# Patient Record
Sex: FEMALE | Race: WHITE | NOT HISPANIC OR LATINO | Employment: FULL TIME | URBAN - NONMETROPOLITAN AREA
[De-identification: names, ages, dates, MRNs, and addresses within clinical notes are randomized per-mention and may not be internally consistent; named-entity substitution may affect disease eponyms.]

---

## 2023-06-05 DIAGNOSIS — J45.902 ASTHMA WITH STATUS ASTHMATICUS, UNSPECIFIED ASTHMA SEVERITY, UNSPECIFIED WHETHER PERSISTENT (HHS-HCC): ICD-10-CM

## 2023-06-05 RX ORDER — ALBUTEROL SULFATE 90 UG/1
2 AEROSOL, METERED RESPIRATORY (INHALATION) EVERY 4 HOURS PRN
COMMUNITY
Start: 2019-04-30 | End: 2023-06-05 | Stop reason: SDUPTHER

## 2023-06-05 RX ORDER — ALBUTEROL SULFATE 0.83 MG/ML
SOLUTION RESPIRATORY (INHALATION)
COMMUNITY
Start: 2014-10-03

## 2023-06-05 RX ORDER — ALBUTEROL SULFATE 90 UG/1
2 AEROSOL, METERED RESPIRATORY (INHALATION) EVERY 4 HOURS PRN
Qty: 18 G | Refills: 0 | Status: SHIPPED | OUTPATIENT
Start: 2023-06-05 | End: 2023-08-02 | Stop reason: SDUPTHER

## 2023-06-09 PROBLEM — I10 HYPERTENSION: Status: ACTIVE | Noted: 2023-06-09

## 2023-06-09 PROBLEM — L25.9 CONTACT DERMATITIS: Status: ACTIVE | Noted: 2023-06-09

## 2023-06-09 PROBLEM — K57.30 DIVERTICULOSIS OF COLON: Status: ACTIVE | Noted: 2023-06-09

## 2023-06-09 PROBLEM — D49.2 SKIN GROWTH: Status: ACTIVE | Noted: 2023-06-09

## 2023-06-09 PROBLEM — E66.9 OBESE: Status: ACTIVE | Noted: 2023-06-09

## 2023-06-09 PROBLEM — J45.909 AB (ASTHMATIC BRONCHITIS) (HHS-HCC): Status: ACTIVE | Noted: 2023-06-09

## 2023-06-09 PROBLEM — J01.90 ACUTE SINUSITIS: Status: ACTIVE | Noted: 2023-06-09

## 2023-06-09 PROBLEM — H10.30 ACUTE CONJUNCTIVITIS: Status: ACTIVE | Noted: 2023-06-09

## 2023-06-09 PROBLEM — G43.909 MIGRAINE HEADACHE: Status: ACTIVE | Noted: 2023-06-09

## 2023-06-09 PROBLEM — B37.31 VAGINAL CANDIDIASIS: Status: ACTIVE | Noted: 2023-06-09

## 2023-06-09 PROBLEM — K52.9 ACUTE GASTROENTERITIS: Status: ACTIVE | Noted: 2023-06-09

## 2023-06-09 PROBLEM — M85.80 OSTEOPENIA: Status: ACTIVE | Noted: 2023-06-09

## 2023-06-09 PROBLEM — J45.901 ACUTE ASTHMA EXACERBATION (HHS-HCC): Status: ACTIVE | Noted: 2023-06-09

## 2023-06-09 PROBLEM — R10.11 ABDOMINAL PAIN, RUQ: Status: ACTIVE | Noted: 2023-06-09

## 2023-06-09 PROBLEM — M77.32 HEEL SPUR, LEFT: Status: ACTIVE | Noted: 2023-06-09

## 2023-06-09 PROBLEM — K21.9 GERD (GASTROESOPHAGEAL REFLUX DISEASE): Status: ACTIVE | Noted: 2023-06-09

## 2023-06-09 PROBLEM — M72.2 PLANTAR FASCIITIS: Status: ACTIVE | Noted: 2023-06-09

## 2023-06-09 PROBLEM — R52 BODY ACHES: Status: ACTIVE | Noted: 2023-06-09

## 2023-06-09 PROBLEM — F41.9 ANXIETY: Status: ACTIVE | Noted: 2023-06-09

## 2023-06-09 PROBLEM — Z04.9 CONDITION NOT FOUND: Status: ACTIVE | Noted: 2023-06-09

## 2023-06-09 PROBLEM — D12.6 TUBULAR ADENOMA OF COLON: Status: ACTIVE | Noted: 2023-06-09

## 2023-06-09 PROBLEM — S30.860A TICK BITE OF BACK: Status: ACTIVE | Noted: 2023-06-09

## 2023-06-09 PROBLEM — R05.9 COUGH: Status: ACTIVE | Noted: 2023-06-09

## 2023-06-09 PROBLEM — R06.00 DYSPNEA: Status: ACTIVE | Noted: 2023-06-09

## 2023-06-09 PROBLEM — K52.9 CHRONIC DIARRHEA: Status: ACTIVE | Noted: 2023-06-09

## 2023-06-09 PROBLEM — L30.9 DERMATITIS, ECZEMATOID: Status: ACTIVE | Noted: 2023-06-09

## 2023-06-09 PROBLEM — J18.9 PNEUMONIA: Status: ACTIVE | Noted: 2023-06-09

## 2023-06-09 PROBLEM — U07.1 COVID-19: Status: ACTIVE | Noted: 2023-06-09
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
## 2023-06-09 PROBLEM — K02.9 DENTAL CARIES: Status: ACTIVE | Noted: 2023-06-09

## 2023-06-09 PROBLEM — R10.33 UMBILICAL PAIN: Status: ACTIVE | Noted: 2023-06-09

## 2023-06-09 PROBLEM — E55.9 VITAMIN D DEFICIENCY: Status: ACTIVE | Noted: 2023-06-09

## 2023-06-09 PROBLEM — N39.0 URINARY TRACT INFECTION: Status: ACTIVE | Noted: 2023-06-09

## 2023-06-09 PROBLEM — R09.02 HYPOXIA: Status: ACTIVE | Noted: 2023-06-09

## 2023-06-09 PROBLEM — N13.30 HYDRONEPHROSIS: Status: ACTIVE | Noted: 2023-06-09

## 2023-06-09 PROBLEM — B37.0 ORAL CANDIDIASIS: Status: ACTIVE | Noted: 2023-06-09

## 2023-06-09 PROBLEM — E11.9 CONTROLLED DIABETES MELLITUS (MULTI): Status: ACTIVE | Noted: 2023-06-09

## 2023-06-09 PROBLEM — M79.672 LEFT FOOT PAIN: Status: ACTIVE | Noted: 2023-06-09

## 2023-06-09 PROBLEM — M54.9 BACK PAIN, ACUTE: Status: ACTIVE | Noted: 2023-06-09

## 2023-06-09 PROBLEM — J02.9 SORE THROAT: Status: ACTIVE | Noted: 2023-06-09

## 2023-06-09 PROBLEM — E78.00 HYPERCHOLESTEROLEMIA: Status: ACTIVE | Noted: 2023-06-09

## 2023-06-09 PROBLEM — K42.9 UMBILICAL HERNIA: Status: ACTIVE | Noted: 2023-06-09

## 2023-06-09 PROBLEM — J02.9 ACUTE PHARYNGITIS: Status: ACTIVE | Noted: 2023-06-09

## 2023-06-09 PROBLEM — B35.1 ONYCHOMYCOSIS: Status: ACTIVE | Noted: 2023-06-09

## 2023-06-09 PROBLEM — M81.0 POST-MENOPAUSAL OSTEOPOROSIS: Status: ACTIVE | Noted: 2023-06-09

## 2023-06-09 PROBLEM — E11.65 TYPE 2 DIABETES MELLITUS WITH HYPERGLYCEMIA, WITHOUT LONG-TERM CURRENT USE OF INSULIN (MULTI): Status: ACTIVE | Noted: 2023-06-09

## 2023-06-09 RX ORDER — CALCIUM CITRATE/VITAMIN D3 200MG-6.25
TABLET ORAL
COMMUNITY
Start: 2022-03-23 | End: 2023-10-02 | Stop reason: SDUPTHER

## 2023-06-09 RX ORDER — SUMATRIPTAN 5 MG/1
SPRAY NASAL
COMMUNITY

## 2023-06-09 RX ORDER — CHOLECALCIFEROL (VITAMIN D3) 50 MCG
1 TABLET ORAL DAILY
COMMUNITY
Start: 2014-02-28

## 2023-06-09 RX ORDER — GUAIFENESIN 1200 MG/1
TABLET, EXTENDED RELEASE ORAL EVERY 12 HOURS PRN
COMMUNITY
Start: 2022-04-20

## 2023-06-09 RX ORDER — BUDESONIDE AND FORMOTEROL FUMARATE DIHYDRATE 160; 4.5 UG/1; UG/1
AEROSOL RESPIRATORY (INHALATION) 2 TIMES DAILY
COMMUNITY

## 2023-06-09 RX ORDER — PEN NEEDLE, DIABETIC 31 GX5/16"
NEEDLE, DISPOSABLE MISCELLANEOUS
COMMUNITY
Start: 2015-04-06

## 2023-06-09 RX ORDER — ATORVASTATIN CALCIUM 40 MG/1
TABLET, FILM COATED ORAL
COMMUNITY
Start: 2019-05-10 | End: 2023-08-02 | Stop reason: SDUPTHER

## 2023-06-12 ENCOUNTER — APPOINTMENT (OUTPATIENT)
Dept: PRIMARY CARE | Facility: CLINIC | Age: 63
End: 2023-06-12

## 2023-07-10 ENCOUNTER — APPOINTMENT (OUTPATIENT)
Dept: PRIMARY CARE | Facility: CLINIC | Age: 63
End: 2023-07-10
Payer: COMMERCIAL

## 2023-07-24 ENCOUNTER — TELEPHONE (OUTPATIENT)
Dept: PRIMARY CARE | Facility: CLINIC | Age: 63
End: 2023-07-24
Payer: COMMERCIAL

## 2023-07-24 NOTE — TELEPHONE ENCOUNTER
Transition of Care    Inpatient facility[facility]  Discharge diagnosis: Chest Pain / HTN  Discharged to: Home  Discharge date: 7/23/23  Initial Call date: 7/24/23  Spoke with patient/caregiver: Patient                                                                     Do you need assistance  visits prior to your PCP visit: No  Home health care ordered: No  Have you been contacted by home care and have a start of care date: No  Are you taking medications as prescribed at discharge: Yes    Referral to [facility] Pharmacist: No  Patient advised to bring all medications to PCP follow-up appointment.  Patient advised to follow discharge instructions until provider follow-up.  TCM visit date: 8/1/23  TCM provider visit with: Betty Flores MD

## 2023-08-01 ENCOUNTER — APPOINTMENT (OUTPATIENT)
Dept: PRIMARY CARE | Facility: CLINIC | Age: 63
End: 2023-08-01
Payer: COMMERCIAL

## 2023-08-02 ENCOUNTER — OFFICE VISIT (OUTPATIENT)
Dept: PRIMARY CARE | Facility: CLINIC | Age: 63
End: 2023-08-02
Payer: COMMERCIAL

## 2023-08-02 VITALS
OXYGEN SATURATION: 93 % | BODY MASS INDEX: 41.7 KG/M2 | WEIGHT: 250.6 LBS | SYSTOLIC BLOOD PRESSURE: 122 MMHG | HEART RATE: 66 BPM | DIASTOLIC BLOOD PRESSURE: 86 MMHG

## 2023-08-02 DIAGNOSIS — E66.01 CLASS 3 SEVERE OBESITY DUE TO EXCESS CALORIES WITH SERIOUS COMORBIDITY AND BODY MASS INDEX (BMI) OF 40.0 TO 44.9 IN ADULT (MULTI): ICD-10-CM

## 2023-08-02 DIAGNOSIS — R07.9 CHEST PAIN, UNSPECIFIED TYPE: Primary | ICD-10-CM

## 2023-08-02 DIAGNOSIS — E78.00 HYPERCHOLESTEROLEMIA: ICD-10-CM

## 2023-08-02 DIAGNOSIS — I10 HYPERTENSION, UNSPECIFIED TYPE: ICD-10-CM

## 2023-08-02 DIAGNOSIS — J45.902 ASTHMA WITH STATUS ASTHMATICUS, UNSPECIFIED ASTHMA SEVERITY, UNSPECIFIED WHETHER PERSISTENT (HHS-HCC): ICD-10-CM

## 2023-08-02 DIAGNOSIS — E11.65 TYPE 2 DIABETES MELLITUS WITH HYPERGLYCEMIA, WITHOUT LONG-TERM CURRENT USE OF INSULIN (MULTI): ICD-10-CM

## 2023-08-02 PROCEDURE — 3079F DIAST BP 80-89 MM HG: CPT | Performed by: INTERNAL MEDICINE

## 2023-08-02 PROCEDURE — 3074F SYST BP LT 130 MM HG: CPT | Performed by: INTERNAL MEDICINE

## 2023-08-02 PROCEDURE — 99496 TRANSJ CARE MGMT HIGH F2F 7D: CPT | Performed by: INTERNAL MEDICINE

## 2023-08-02 RX ORDER — AMLODIPINE BESYLATE 5 MG/1
5 TABLET ORAL DAILY
COMMUNITY
Start: 2023-07-23 | End: 2023-08-02 | Stop reason: SDUPTHER

## 2023-08-02 RX ORDER — ATORVASTATIN CALCIUM 40 MG/1
40 TABLET, FILM COATED ORAL DAILY
Qty: 30 TABLET | Refills: 1 | Status: SHIPPED | OUTPATIENT
Start: 2023-08-02 | End: 2023-11-01 | Stop reason: SDUPTHER

## 2023-08-02 RX ORDER — ALBUTEROL SULFATE 90 UG/1
2 AEROSOL, METERED RESPIRATORY (INHALATION) EVERY 4 HOURS PRN
Qty: 18 G | Refills: 2 | Status: SHIPPED | OUTPATIENT
Start: 2023-08-02 | End: 2023-11-01 | Stop reason: SDUPTHER

## 2023-08-02 RX ORDER — AMLODIPINE BESYLATE 5 MG/1
5 TABLET ORAL DAILY
Qty: 30 TABLET | Refills: 1 | Status: SHIPPED | OUTPATIENT
Start: 2023-08-02 | End: 2023-09-06 | Stop reason: ALTCHOICE

## 2023-08-02 NOTE — PROGRESS NOTES
Subjective   Patient ID: Kamilah Manzo is a 63 y.o. female who presents for Hospital Follow-up (TCM).  HPI      TCM       Dx chest pain, htn          Under stress          Cardio follow up with stress test next week          Counseling recommended            hypertension          Amlodipine started           Home bp's acceptable no side effects    h/o COVID  no residuals     asthma stable on rx no side effects     hypercholesterolemia on rx no side effects      vit D stable on rx no side effects     DM on diet      diet / exercise rev'd     eyes and feet on follow up     low fat diet     continue meds as are     check CT urogram not done (too $$$)  urology consult not done     follow up Dr Jac rose colonoscopy for h/o colon polyps not done yet  dexa past due  holding off due to poor insurance plan     GYN on follow up     mammogram and labs ordered    Review of Systems   All other systems reviewed and are negative.      Objective   /86   Pulse 66   Wt 114 kg (250 lb 9.6 oz)   SpO2 93%   BMI 41.70 kg/m²   Lab Results   Component Value Date    WBC 11.3 07/22/2023    HGB 15.1 07/22/2023    HCT 47.1 (H) 07/22/2023     07/22/2023    CHOL 191 07/22/2023    TRIG 213 (H) 07/22/2023    HDL 41.6 07/22/2023    ALT 20 07/21/2023    AST 14 07/21/2023     07/22/2023    K 3.6 07/22/2023     07/22/2023    CREATININE 0.53 07/22/2023    BUN 8 07/22/2023    CO2 27 07/22/2023    TSH 2.40 07/21/2023    INR 1.0 07/21/2023    HGBA1C 6.9 (A) 10/20/2021           Physical Exam  Vitals reviewed.   Constitutional:       Appearance: Normal appearance. She is obese.   HENT:      Head: Normocephalic and atraumatic.      Mouth/Throat:      Pharynx: No posterior oropharyngeal erythema.   Eyes:      General: No scleral icterus.     Conjunctiva/sclera: Conjunctivae normal.      Pupils: Pupils are equal, round, and reactive to light.   Cardiovascular:      Rate and Rhythm: Normal rate and regular rhythm.      Heart  sounds: Normal heart sounds.   Pulmonary:      Effort: No respiratory distress.      Breath sounds: No wheezing.   Abdominal:      General: Abdomen is flat. Bowel sounds are normal. There is no distension.      Palpations: Abdomen is soft. There is no mass.      Tenderness: There is no abdominal tenderness. There is no rebound.   Musculoskeletal:         General: Normal range of motion.      Cervical back: Normal range of motion and neck supple.   Skin:     General: Skin is warm and dry.   Neurological:      General: No focal deficit present.      Mental Status: She is alert and oriented to person, place, and time. Mental status is at baseline.   Psychiatric:         Mood and Affect: Mood normal.         Behavior: Behavior normal.         Thought Content: Thought content normal.         Judgment: Judgment normal.         Problem List Items Addressed This Visit          Cardiac and Vasculature    Hypercholesterolemia    Relevant Medications    atorvastatin (Lipitor) 40 mg tablet    Hypertension    Relevant Medications    amLODIPine (Norvasc) 5 mg tablet       Endocrine/Metabolic    Obese    Type 2 diabetes mellitus with hyperglycemia, without long-term current use of insulin (CMS/[facility])     Other Visit Diagnoses       Chest pain, unspecified type    -  Primary    Asthma with status asthmaticus, unspecified asthma severity, unspecified whether persistent        Relevant Medications    albuterol 90 mcg/actuation inhaler          Assessment/Plan   TCM       Dx chest pain, htn          Under stress          Cardio follow up with stress test next week          Counseling recommended pt declined          Wellbutrin discussed            hypertension          Amlodipine started no side effects          Home bp's acceptable ans scanned    h/o COVID  no residuals     asthma stable on rx no side effects     hypercholesterolemia on rx no side effects      vit D stable on rx no side effects     DM on diet      diet / exercise  rev'd     eyes and feet on follow up     low fat diet     continue meds as are     check CT urogram not done (too $$$)  urology consult not done     follow up Dr Jac rose colonoscopy for h/o colon polyps not done yet    dexa past due     GYN on follow up     mammogram and labs ordered not done    mammogram 12-21  dexa 8-20  colonoscopy 2013  GYN past due  CT lung cancer screening n/a  immunizations rev'd  BMI 41.3    Follow up 3 weeks

## 2023-08-22 ENCOUNTER — APPOINTMENT (OUTPATIENT)
Dept: PRIMARY CARE | Facility: CLINIC | Age: 63
End: 2023-08-22
Payer: COMMERCIAL

## 2023-08-28 ENCOUNTER — APPOINTMENT (OUTPATIENT)
Dept: PRIMARY CARE | Facility: CLINIC | Age: 63
End: 2023-08-28
Payer: COMMERCIAL

## 2023-09-06 ENCOUNTER — OFFICE VISIT (OUTPATIENT)
Dept: PRIMARY CARE | Facility: CLINIC | Age: 63
End: 2023-09-06
Payer: COMMERCIAL

## 2023-09-06 ENCOUNTER — LAB (OUTPATIENT)
Dept: LAB | Facility: LAB | Age: 63
End: 2023-09-06
Payer: COMMERCIAL

## 2023-09-06 VITALS
HEART RATE: 96 BPM | SYSTOLIC BLOOD PRESSURE: 142 MMHG | WEIGHT: 251.8 LBS | OXYGEN SATURATION: 95 % | BODY MASS INDEX: 41.9 KG/M2 | DIASTOLIC BLOOD PRESSURE: 84 MMHG

## 2023-09-06 DIAGNOSIS — R35.0 URINARY FREQUENCY: ICD-10-CM

## 2023-09-06 DIAGNOSIS — I10 HYPERTENSION, UNSPECIFIED TYPE: ICD-10-CM

## 2023-09-06 DIAGNOSIS — E55.9 VITAMIN D DEFICIENCY: ICD-10-CM

## 2023-09-06 DIAGNOSIS — J45.20 MILD INTERMITTENT ASTHMA, UNSPECIFIED WHETHER COMPLICATED (HHS-HCC): ICD-10-CM

## 2023-09-06 DIAGNOSIS — Z00.00 ANNUAL PHYSICAL EXAM: Primary | ICD-10-CM

## 2023-09-06 DIAGNOSIS — E78.00 HYPERCHOLESTEROLEMIA: ICD-10-CM

## 2023-09-06 DIAGNOSIS — E11.65 TYPE 2 DIABETES MELLITUS WITH HYPERGLYCEMIA, WITHOUT LONG-TERM CURRENT USE OF INSULIN (MULTI): ICD-10-CM

## 2023-09-06 DIAGNOSIS — K44.9 PARAESOPHAGEAL HERNIA: ICD-10-CM

## 2023-09-06 DIAGNOSIS — E66.01 CLASS 3 SEVERE OBESITY DUE TO EXCESS CALORIES WITH SERIOUS COMORBIDITY AND BODY MASS INDEX (BMI) OF 40.0 TO 44.9 IN ADULT (MULTI): ICD-10-CM

## 2023-09-06 LAB
ALANINE AMINOTRANSFERASE (SGPT) (U/L) IN SER/PLAS: 41 U/L (ref 7–45)
ALBUMIN (G/DL) IN SER/PLAS: 4 G/DL (ref 3.4–5)
ALKALINE PHOSPHATASE (U/L) IN SER/PLAS: 86 U/L (ref 33–136)
ANION GAP IN SER/PLAS: 10 MMOL/L (ref 10–20)
ASPARTATE AMINOTRANSFERASE (SGOT) (U/L) IN SER/PLAS: 26 U/L (ref 9–39)
BASOPHILS (10*3/UL) IN BLOOD BY AUTOMATED COUNT: 0.06 X10E9/L (ref 0–0.1)
BASOPHILS/100 LEUKOCYTES IN BLOOD BY AUTOMATED COUNT: 0.6 % (ref 0–2)
BILIRUBIN TOTAL (MG/DL) IN SER/PLAS: 0.5 MG/DL (ref 0–1.2)
CALCIUM (MG/DL) IN SER/PLAS: 9.5 MG/DL (ref 8.6–10.3)
CARBON DIOXIDE, TOTAL (MMOL/L) IN SER/PLAS: 32 MMOL/L (ref 21–32)
CHLORIDE (MMOL/L) IN SER/PLAS: 101 MMOL/L (ref 98–107)
CREATININE (MG/DL) IN SER/PLAS: 0.6 MG/DL (ref 0.5–1.05)
EOSINOPHILS (10*3/UL) IN BLOOD BY AUTOMATED COUNT: 0.36 X10E9/L (ref 0–0.7)
EOSINOPHILS/100 LEUKOCYTES IN BLOOD BY AUTOMATED COUNT: 3.4 % (ref 0–6)
ERYTHROCYTE DISTRIBUTION WIDTH (RATIO) BY AUTOMATED COUNT: 13.3 % (ref 11.5–14.5)
ERYTHROCYTE MEAN CORPUSCULAR HEMOGLOBIN CONCENTRATION (G/DL) BY AUTOMATED: 31.6 G/DL (ref 32–36)
ERYTHROCYTE MEAN CORPUSCULAR VOLUME (FL) BY AUTOMATED COUNT: 97 FL (ref 80–100)
ERYTHROCYTES (10*6/UL) IN BLOOD BY AUTOMATED COUNT: 5.03 X10E12/L (ref 4–5.2)
GFR FEMALE: >90 ML/MIN/1.73M2
GLUCOSE (MG/DL) IN SER/PLAS: 194 MG/DL (ref 74–99)
HEMATOCRIT (%) IN BLOOD BY AUTOMATED COUNT: 48.7 % (ref 36–46)
HEMOGLOBIN (G/DL) IN BLOOD: 15.4 G/DL (ref 12–16)
IMMATURE GRANULOCYTES/100 LEUKOCYTES IN BLOOD BY AUTOMATED COUNT: 0.5 % (ref 0–0.9)
LEUKOCYTES (10*3/UL) IN BLOOD BY AUTOMATED COUNT: 10.7 X10E9/L (ref 4.4–11.3)
LYMPHOCYTES (10*3/UL) IN BLOOD BY AUTOMATED COUNT: 2.96 X10E9/L (ref 1.2–4.8)
LYMPHOCYTES/100 LEUKOCYTES IN BLOOD BY AUTOMATED COUNT: 27.6 % (ref 13–44)
MONOCYTES (10*3/UL) IN BLOOD BY AUTOMATED COUNT: 0.67 X10E9/L (ref 0.1–1)
MONOCYTES/100 LEUKOCYTES IN BLOOD BY AUTOMATED COUNT: 6.2 % (ref 2–10)
NEUTROPHILS (10*3/UL) IN BLOOD BY AUTOMATED COUNT: 6.64 X10E9/L (ref 1.2–7.7)
NEUTROPHILS/100 LEUKOCYTES IN BLOOD BY AUTOMATED COUNT: 61.7 % (ref 40–80)
PLATELETS (10*3/UL) IN BLOOD AUTOMATED COUNT: 348 X10E9/L (ref 150–450)
POC APPEARANCE, URINE: CLEAR
POC BILIRUBIN, URINE: NEGATIVE
POC BLOOD, URINE: NEGATIVE
POC COLOR, URINE: YELLOW
POC GLUCOSE, URINE: ABNORMAL MG/DL
POC KETONES, URINE: NEGATIVE MG/DL
POC LEUKOCYTES, URINE: NEGATIVE
POC NITRITE,URINE: NEGATIVE
POC PH, URINE: 5 PH
POC PROTEIN, URINE: NEGATIVE MG/DL
POC SPECIFIC GRAVITY, URINE: >=1.03
POC UROBILINOGEN, URINE: 0.2 EU/DL
POTASSIUM (MMOL/L) IN SER/PLAS: 4.8 MMOL/L (ref 3.5–5.3)
PROTEIN TOTAL: 6.8 G/DL (ref 6.4–8.2)
SODIUM (MMOL/L) IN SER/PLAS: 138 MMOL/L (ref 136–145)
THYROXINE (T4) FREE (NG/DL) IN SER/PLAS: 0.99 NG/DL (ref 0.61–1.12)
UREA NITROGEN (MG/DL) IN SER/PLAS: 10 MG/DL (ref 6–23)

## 2023-09-06 PROCEDURE — 82306 VITAMIN D 25 HYDROXY: CPT

## 2023-09-06 PROCEDURE — 84443 ASSAY THYROID STIM HORMONE: CPT

## 2023-09-06 PROCEDURE — 81002 URINALYSIS NONAUTO W/O SCOPE: CPT | Performed by: INTERNAL MEDICINE

## 2023-09-06 PROCEDURE — G0447 BEHAVIOR COUNSEL OBESITY 15M: HCPCS | Performed by: INTERNAL MEDICINE

## 2023-09-06 PROCEDURE — 99396 PREV VISIT EST AGE 40-64: CPT | Performed by: INTERNAL MEDICINE

## 2023-09-06 PROCEDURE — 4010F ACE/ARB THERAPY RXD/TAKEN: CPT | Performed by: INTERNAL MEDICINE

## 2023-09-06 PROCEDURE — 85025 COMPLETE CBC W/AUTO DIFF WBC: CPT

## 2023-09-06 PROCEDURE — 36415 COLL VENOUS BLD VENIPUNCTURE: CPT

## 2023-09-06 PROCEDURE — 3079F DIAST BP 80-89 MM HG: CPT | Performed by: INTERNAL MEDICINE

## 2023-09-06 PROCEDURE — 84439 ASSAY OF FREE THYROXINE: CPT

## 2023-09-06 PROCEDURE — 83036 HEMOGLOBIN GLYCOSYLATED A1C: CPT

## 2023-09-06 PROCEDURE — 3077F SYST BP >= 140 MM HG: CPT | Performed by: INTERNAL MEDICINE

## 2023-09-06 PROCEDURE — 80053 COMPREHEN METABOLIC PANEL: CPT

## 2023-09-06 RX ORDER — LOSARTAN POTASSIUM 25 MG/1
25 TABLET ORAL DAILY
COMMUNITY
Start: 2023-08-30 | End: 2023-09-19 | Stop reason: SDUPTHER

## 2023-09-06 ASSESSMENT — ENCOUNTER SYMPTOMS: HYPERTENSION: 1

## 2023-09-06 NOTE — PROGRESS NOTES
Subjective   Patient ID: Kamilah Manzo is a 63 y.o. female who presents for yearly physical / Med Management, Results, and Hypertension.  Hypertension            Yearly physical    mammogram 12-21  dexa 8-20  colonoscopy 2013  GYN past due  CT lung cancer screening n/a  immunizations rev'd  BMI 41.9              Follow up TCM           Dx chest pain, htn          Under stress          Cardio follow up with stress test neg              hypertension          Amlodipine stopped due to edema          Losartan started          Home bp's acceptable ans scanned           Urinary frequency no dysuria        Paraesophageal hernia discussed      GI on follow up     h/o COVID  no residuals     asthma stable on rx no side effects     hypercholesterolemia on rx no side effects      vit D stable on rx no side effects     DM on diet      diet / exercise rev'd     eyes and feet on follow up     check CT urogram not done (too $$$)  urology consult not done     follow up Dr Jac rose colonoscopy for h/o colon polyps not done yet     dexa past due     GYN on follow up     mammogram and labs ordered not done      Review of Systems   All other systems reviewed and are negative.      Objective   /84   Pulse 96   Wt 114 kg (251 lb 12.8 oz)   SpO2 95%   BMI 41.90 kg/m²   Lab Results   Component Value Date    WBC 10.7 09/06/2023    HGB 15.4 09/06/2023    HCT 48.7 (H) 09/06/2023     09/06/2023    CHOL 191 07/22/2023    TRIG 213 (H) 07/22/2023    HDL 41.6 07/22/2023    ALT 41 09/06/2023    AST 26 09/06/2023     09/06/2023    K 4.8 09/06/2023     09/06/2023    CREATININE 0.60 09/06/2023    BUN 10 09/06/2023    CO2 32 09/06/2023    TSH <0.01 (L) 09/06/2023    INR 1.0 07/21/2023    HGBA1C 6.9 (A) 10/20/2021           Physical Exam  Vitals reviewed.   Constitutional:       Appearance: Normal appearance. She is obese.   HENT:      Head: Normocephalic and atraumatic.      Mouth/Throat:      Pharynx: No posterior  oropharyngeal erythema.   Eyes:      General: No scleral icterus.     Conjunctiva/sclera: Conjunctivae normal.      Pupils: Pupils are equal, round, and reactive to light.   Cardiovascular:      Rate and Rhythm: Normal rate and regular rhythm.      Heart sounds: Normal heart sounds.   Pulmonary:      Effort: No respiratory distress.      Breath sounds: No wheezing.   Abdominal:      General: Abdomen is flat. Bowel sounds are normal. There is no distension.      Palpations: Abdomen is soft. There is no mass.      Tenderness: There is no abdominal tenderness. There is no rebound.   Musculoskeletal:         General: Normal range of motion.      Cervical back: Normal range of motion and neck supple.   Skin:     General: Skin is warm and dry.   Neurological:      General: No focal deficit present.      Mental Status: She is alert and oriented to person, place, and time. Mental status is at baseline.   Psychiatric:         Mood and Affect: Mood normal.         Behavior: Behavior normal.         Thought Content: Thought content normal.         Judgment: Judgment normal.         Problem List Items Addressed This Visit          Cardiac and Vasculature    Hypercholesterolemia    Hypertension    Relevant Orders    CBC and Auto Differential (Completed)       Endocrine/Metabolic    Obese    Type 2 diabetes mellitus with hyperglycemia, without long-term current use of insulin (CMS/[facility])    Relevant Orders    Hemoglobin A1C    TSH with reflex to Free T4 if abnormal (Completed)    Comprehensive Metabolic Panel (Completed)    Vitamin D deficiency    Relevant Orders    Vitamin D 25 hydroxy     Other Visit Diagnoses       Annual physical exam    -  Primary    Urinary frequency        Relevant Orders    POCT UA (nonautomated w/o microscopy) manually resulted (Completed)    Paraesophageal hernia        Mild intermittent asthma, unspecified whether complicated              Assessment/Plan            Yearly physical    mammogram  12-21  dexa 8-20  colonoscopy 2013  GYN past due  CT lung cancer screening n/a  immunizations rev'd  BMI 41.9              Follow up TCM           Dx chest pain, htn          Under stress          Cardio follow up with stress test neg              hypertension          Amlodipine stopped due to edema          Losartan started          Home bp's acceptable ans scanned           Urinary frequency no dysuria     h/o COVID  no residuals     asthma stable on rx no side effects     hypercholesterolemia on rx no side effects      vit D stable on rx no side effects     DM on diet      diet / exercise rev'd  I spent >15 minutes minutes face to face with individual  providing recommendations for nutrition choices and exercise plan to help achieve weight reduction.      eyes and feet on follow up     check CT urogram not done (too $$$)  urology consult not done     follow up Dr Jac rose colonoscopy for h/o colon polyps not done yet     dexa past due     GYN on follow up     mammogram and labs ordered not done    Check labs    Follow up 1 month

## 2023-09-07 LAB
CALCIDIOL (25 OH VITAMIN D3) (NG/ML) IN SER/PLAS: 47 NG/ML
ESTIMATED AVERAGE GLUCOSE FOR HBA1C: 212 MG/DL
HEMOGLOBIN A1C/HEMOGLOBIN TOTAL IN BLOOD: 9 %
THYROTROPIN (MIU/L) IN SER/PLAS BY DETECTION LIMIT <= 0.05 MIU/L: 1.4 MIU/L (ref 0.44–3.98)

## 2023-09-12 NOTE — RESULT ENCOUNTER NOTE
Please call the patient regarding her abnormal result.  Move up appt  Blood sugars too high for diet alone

## 2023-09-19 ENCOUNTER — OFFICE VISIT (OUTPATIENT)
Dept: PRIMARY CARE | Facility: CLINIC | Age: 63
End: 2023-09-19
Payer: COMMERCIAL

## 2023-09-19 VITALS
HEART RATE: 93 BPM | WEIGHT: 250.2 LBS | DIASTOLIC BLOOD PRESSURE: 84 MMHG | SYSTOLIC BLOOD PRESSURE: 132 MMHG | BODY MASS INDEX: 41.64 KG/M2 | OXYGEN SATURATION: 94 %

## 2023-09-19 DIAGNOSIS — K44.9 PARAESOPHAGEAL HERNIA: ICD-10-CM

## 2023-09-19 DIAGNOSIS — E78.00 HYPERCHOLESTEROLEMIA: ICD-10-CM

## 2023-09-19 DIAGNOSIS — E11.65 TYPE 2 DIABETES MELLITUS WITH HYPERGLYCEMIA, WITHOUT LONG-TERM CURRENT USE OF INSULIN (MULTI): Primary | ICD-10-CM

## 2023-09-19 DIAGNOSIS — E66.01 CLASS 3 SEVERE OBESITY DUE TO EXCESS CALORIES WITH SERIOUS COMORBIDITY AND BODY MASS INDEX (BMI) OF 40.0 TO 44.9 IN ADULT (MULTI): ICD-10-CM

## 2023-09-19 DIAGNOSIS — I10 HYPERTENSION, UNSPECIFIED TYPE: ICD-10-CM

## 2023-09-19 DIAGNOSIS — J45.20 MILD INTERMITTENT ASTHMA WITHOUT COMPLICATION (HHS-HCC): ICD-10-CM

## 2023-09-19 DIAGNOSIS — E55.9 VITAMIN D DEFICIENCY: ICD-10-CM

## 2023-09-19 PROCEDURE — 3079F DIAST BP 80-89 MM HG: CPT | Performed by: INTERNAL MEDICINE

## 2023-09-19 PROCEDURE — 4010F ACE/ARB THERAPY RXD/TAKEN: CPT | Performed by: INTERNAL MEDICINE

## 2023-09-19 PROCEDURE — 3052F HG A1C>EQUAL 8.0%<EQUAL 9.0%: CPT | Performed by: INTERNAL MEDICINE

## 2023-09-19 PROCEDURE — 99214 OFFICE O/P EST MOD 30 MIN: CPT | Performed by: INTERNAL MEDICINE

## 2023-09-19 PROCEDURE — 3075F SYST BP GE 130 - 139MM HG: CPT | Performed by: INTERNAL MEDICINE

## 2023-09-19 RX ORDER — METFORMIN HYDROCHLORIDE 500 MG/1
500 TABLET ORAL
Qty: 90 TABLET | Refills: 0 | Status: SHIPPED | OUTPATIENT
Start: 2023-09-19 | End: 2023-10-24 | Stop reason: SDUPTHER

## 2023-09-19 RX ORDER — LOSARTAN POTASSIUM 25 MG/1
25 TABLET ORAL DAILY
Qty: 90 TABLET | Refills: 0 | Status: SHIPPED | OUTPATIENT
Start: 2023-09-19 | End: 2023-11-21 | Stop reason: SDUPTHER

## 2023-09-19 ASSESSMENT — ENCOUNTER SYMPTOMS: HYPERTENSION: 1

## 2023-09-19 NOTE — PROGRESS NOTES
Subjective   Patient ID: Kamilah Manzo is a 63 y.o. female who presents for Med Management, Hypertension, Diabetes Mellitus, and Hyperlipidemia.  Hypertension    Hyperlipidemia    Follow up bp, bs             h/o chest pain, htn          Under stress          Cardio follow up with stress test neg            DM -2           Start metformin 500 mg daily          Risk / benefits rev'd          Follow bs              hypertension better on rx no side effects          Home bp's acceptable ans scanned         Paraesophageal hernia       Surgical consult     h/o COVID  no residuals     asthma stable on rx no side effects     hypercholesterolemia on rx no side effects      vit D stable on rx no side effects     diet / exercise rev'd     eyes and feet on follow up     check CT urogram not done (too $$$)  urology consult not done     follow up Dr Jac rose colonoscopy for h/o colon polyps not done yet     dexa past due     GYN on follow up     mammogram and labs ordered not done    Review of Systems   All other systems reviewed and are negative.      Objective   /84   Pulse 93   Wt 113 kg (250 lb 3.2 oz)   SpO2 94%   BMI 41.64 kg/m²   Lab Results   Component Value Date    WBC 10.7 09/06/2023    HGB 15.4 09/06/2023    HCT 48.7 (H) 09/06/2023     09/06/2023    CHOL 191 07/22/2023    TRIG 213 (H) 07/22/2023    HDL 41.6 07/22/2023    ALT 41 09/06/2023    AST 26 09/06/2023     09/06/2023    K 4.8 09/06/2023     09/06/2023    CREATININE 0.60 09/06/2023    BUN 10 09/06/2023    CO2 32 09/06/2023    TSH 1.40 09/06/2023    INR 1.0 07/21/2023    HGBA1C 9.0 (A) 09/06/2023           Physical Exam  Vitals reviewed.   Constitutional:       Appearance: Normal appearance. She is obese.   HENT:      Head: Normocephalic and atraumatic.      Mouth/Throat:      Pharynx: No posterior oropharyngeal erythema.   Eyes:      General: No scleral icterus.     Conjunctiva/sclera: Conjunctivae normal.      Pupils: Pupils are  equal, round, and reactive to light.   Cardiovascular:      Rate and Rhythm: Normal rate and regular rhythm.      Heart sounds: Normal heart sounds.   Pulmonary:      Effort: No respiratory distress.      Breath sounds: No wheezing.   Abdominal:      General: Abdomen is flat. Bowel sounds are normal. There is no distension.      Palpations: Abdomen is soft. There is no mass.      Tenderness: There is no abdominal tenderness. There is no rebound.   Musculoskeletal:         General: Normal range of motion.      Cervical back: Normal range of motion and neck supple.   Skin:     General: Skin is warm and dry.   Neurological:      General: No focal deficit present.      Mental Status: She is alert and oriented to person, place, and time. Mental status is at baseline.   Psychiatric:         Mood and Affect: Mood normal.         Behavior: Behavior normal.         Thought Content: Thought content normal.         Judgment: Judgment normal.       Problem List Items Addressed This Visit          Cardiac and Vasculature    Hypercholesterolemia    Hypertension    Relevant Medications    losartan (Cozaar) 25 mg tablet       Endocrine/Metabolic    Obese    Type 2 diabetes mellitus with hyperglycemia, without long-term current use of insulin (CMS/[facility]) - Primary    Relevant Medications    metFORMIN (Glucophage) 500 mg tablet    Vitamin D deficiency     Other Visit Diagnoses       Paraesophageal hernia        Relevant Orders    Referral to Bariatric Surgery    Mild intermittent asthma without complication              Assessment/Plan   Follow up bp, bs             h/o chest pain, htn          Under stress          Cardio follow up with stress test neg            DM -2           HBA1C 9  9-23          Start metformin 500 mg daily          Risk / benefits rev'd          Follow bs          Pt declined nutrition consult              hypertension better on rx no side effects          Home bp's acceptable ans scanned         Paraesophageal  hernia       Surgical consult     h/o COVID  no residuals     asthma stable on rx no side effects     hypercholesterolemia on rx no side effects      vit D stable on rx no side effects     diet / exercise rev'd     eyes and feet on follow up     check CT urogram not done (too $$$)  urology consult not done     follow up Dr Nathan re colonoscopy for h/o colon polyps not done yet     dexa past due     GYN on follow up     mammogram and labs ordered not done      mammogram 12-21  dexa 8-20  colonoscopy 2013  GYN past due  CT lung cancer screening n/a  immunizations rev'd  BMI 41.7    Follow up 1 month

## 2023-09-28 ENCOUNTER — TELEPHONE (OUTPATIENT)
Dept: PRIMARY CARE | Facility: CLINIC | Age: 63
End: 2023-09-28
Payer: COMMERCIAL

## 2023-09-28 NOTE — TELEPHONE ENCOUNTER
Patient called saying that the metformin that she was put on recently is not bringing her sugars down. She asked if her dosage could be brought up or if this can wait till her next appt.

## 2023-10-02 ENCOUNTER — APPOINTMENT (OUTPATIENT)
Dept: PRIMARY CARE | Facility: CLINIC | Age: 63
End: 2023-10-02
Payer: COMMERCIAL

## 2023-10-02 DIAGNOSIS — E11.65 TYPE 2 DIABETES MELLITUS WITH HYPERGLYCEMIA, WITHOUT LONG-TERM CURRENT USE OF INSULIN (MULTI): ICD-10-CM

## 2023-10-02 RX ORDER — CALCIUM CITRATE/VITAMIN D3 200MG-6.25
TABLET ORAL
Qty: 100 STRIP | Refills: 3 | Status: SHIPPED | OUTPATIENT
Start: 2023-10-02

## 2023-10-06 ENCOUNTER — TELEPHONE (OUTPATIENT)
Dept: PRIMARY CARE | Facility: CLINIC | Age: 63
End: 2023-10-06
Payer: COMMERCIAL

## 2023-10-06 NOTE — TELEPHONE ENCOUNTER
Pt complains of Phlem in chest, cough.  She took a covid test today and it was negative.    Do you want me to add her Monday as a virtual?

## 2023-10-18 ENCOUNTER — TELEMEDICINE (OUTPATIENT)
Dept: PRIMARY CARE | Facility: CLINIC | Age: 63
End: 2023-10-18
Payer: COMMERCIAL

## 2023-10-18 DIAGNOSIS — Z72.0 NICOTINE ABUSE: ICD-10-CM

## 2023-10-18 DIAGNOSIS — I10 HYPERTENSION, UNSPECIFIED TYPE: ICD-10-CM

## 2023-10-18 DIAGNOSIS — J45.901 MODERATE ASTHMA WITH ACUTE EXACERBATION, UNSPECIFIED WHETHER PERSISTENT (HHS-HCC): Primary | ICD-10-CM

## 2023-10-18 DIAGNOSIS — E11.65 TYPE 2 DIABETES MELLITUS WITH HYPERGLYCEMIA, WITHOUT LONG-TERM CURRENT USE OF INSULIN (MULTI): ICD-10-CM

## 2023-10-18 DIAGNOSIS — E66.09 OBESITY DUE TO EXCESS CALORIES WITH SERIOUS COMORBIDITY, UNSPECIFIED CLASSIFICATION: ICD-10-CM

## 2023-10-18 PROCEDURE — 99214 OFFICE O/P EST MOD 30 MIN: CPT | Performed by: INTERNAL MEDICINE

## 2023-10-18 RX ORDER — AZITHROMYCIN 500 MG/1
500 TABLET, FILM COATED ORAL DAILY
Qty: 10 TABLET | Refills: 0 | Status: SHIPPED | OUTPATIENT
Start: 2023-10-18 | End: 2023-10-28

## 2023-10-18 RX ORDER — BENZONATATE 100 MG/1
100 CAPSULE ORAL 3 TIMES DAILY PRN
Qty: 30 CAPSULE | Refills: 0 | Status: SHIPPED | OUTPATIENT
Start: 2023-10-18 | End: 2023-11-21

## 2023-10-18 NOTE — PROGRESS NOTES
Subjective   Patient ID: Kamilah Manzo is a 63 y.o. female who presents for sick visit    HPI    Tele visit    Same day sick    Cough productive discolored sputum x last few days  Some wheezing  No fever or dyspnea      h/o chest pain, htn          Under stress          Cardio follow up with stress test neg             DM -2           HBA1C 9  9-23          on metformin tolerating well          Follow bs 130's          Pt declined nutrition consult              hypertension better on rx no side effects          Home bp's acceptable ans scanned          Paraesophageal hernia       Surgical consult     h/o COVID  no residuals     asthma  on rx no side effects     hypercholesterolemia on rx no side effects      vit D stable on rx no side effects     diet / exercise rev'd     eyes and feet on follow up     check CT urogram not done (too $$$)  urology consult not done     follow up Dr Jac rose colonoscopy for h/o colon polyps not done yet     dexa past due     GYN on follow up     mammogram and labs ordered not done        mammogram 12-21  dexa 8-20  colonoscopy 2013  GYN past due  CT lung cancer screening n/a  immunizations rev'd  BMI 41.7     Review of Systems   All other systems reviewed and are negative.      Objective   There were no vitals taken for this visit.  Lab Results   Component Value Date    WBC 10.7 09/06/2023    HGB 15.4 09/06/2023    HCT 48.7 (H) 09/06/2023     09/06/2023    CHOL 191 07/22/2023    TRIG 213 (H) 07/22/2023    HDL 41.6 07/22/2023    ALT 41 09/06/2023    AST 26 09/06/2023     09/06/2023    K 4.8 09/06/2023     09/06/2023    CREATININE 0.60 09/06/2023    BUN 10 09/06/2023    CO2 32 09/06/2023    TSH 1.40 09/06/2023    INR 1.0 07/21/2023    HGBA1C 9.0 (A) 09/06/2023           Physical Exam  Constitutional:       Appearance: She is obese. She is ill-appearing.   Pulmonary:      Effort: Pulmonary effort is normal.   Neurological:      Mental Status: She is alert.    Psychiatric:         Mood and Affect: Mood normal.         Problem List Items Addressed This Visit             ICD-10-CM       Cardiac and Vasculature    Hypertension I10       Endocrine/Metabolic    Obese E66.9    Type 2 diabetes mellitus with hyperglycemia, without long-term current use of insulin (CMS/[facility]) E11.65       Pulmonary and Pneumonias    Asthma with status asthmaticus, unspecified asthma severity, unspecified whether persistent - Primary J45.902    Relevant Medications    azithromycin (Zithromax) 500 mg tablet    benzonatate (Tessalon Perles) 100 mg capsule     Other Visit Diagnoses         Codes    Nicotine abuse     Z72.0          Assessment/Plan     Tele visit    Same day sick    Cough productive discolored sputum x last few days  Some wheezing  No fever or dyspnea  Acute exac asthma  Start zithromax 500 mg daily  Tessalon perles as directed  Risk / benefits rev'd  Continue nebs 3 a day  Rest increase fluids  Consider medrol dose milan if not getting better with treatment      h/o chest pain, htn          Under stress          Cardio follow up with stress test neg             DM -2           HBA1C 9  9-23          on metformin tolerating well          Follow bs 130's          Pt declined nutrition consult              hypertension better on rx no side effects          Home bp's acceptable ans scanned          Paraesophageal hernia       Surgical consult     h/o COVID  no residuals     asthma  on rx no side effects    Smoking cessation discussed     hypercholesterolemia on rx no side effects      vit D stable on rx no side effects     diet / exercise rev'd     eyes and feet on follow up     check CT urogram not done (too $$$)  urology consult not done     follow up Dr Jac rose colonoscopy for h/o colon polyps not done yet     dexa past due     GYN on follow up     mammogram and labs ordered not done        mammogram 12-21  dexa 8-20  colonoscopy 2013  GYN past due  CT lung cancer screening  n/a  immunizations rev'd  BMI 41.7    Follow up as scheduled

## 2023-10-24 ENCOUNTER — OFFICE VISIT (OUTPATIENT)
Dept: PRIMARY CARE | Facility: CLINIC | Age: 63
End: 2023-10-24
Payer: COMMERCIAL

## 2023-10-24 VITALS
OXYGEN SATURATION: 93 % | SYSTOLIC BLOOD PRESSURE: 118 MMHG | DIASTOLIC BLOOD PRESSURE: 76 MMHG | HEART RATE: 92 BPM | WEIGHT: 245.2 LBS | BODY MASS INDEX: 39.64 KG/M2

## 2023-10-24 DIAGNOSIS — E66.01 CLASS 2 SEVERE OBESITY DUE TO EXCESS CALORIES WITH SERIOUS COMORBIDITY AND BODY MASS INDEX (BMI) OF 39.0 TO 39.9 IN ADULT (MULTI): ICD-10-CM

## 2023-10-24 DIAGNOSIS — E11.65 TYPE 2 DIABETES MELLITUS WITH HYPERGLYCEMIA, WITHOUT LONG-TERM CURRENT USE OF INSULIN (MULTI): Primary | ICD-10-CM

## 2023-10-24 DIAGNOSIS — I10 HYPERTENSION, UNSPECIFIED TYPE: ICD-10-CM

## 2023-10-24 DIAGNOSIS — J45.909 MODERATE ASTHMA WITHOUT COMPLICATION, UNSPECIFIED WHETHER PERSISTENT (HHS-HCC): ICD-10-CM

## 2023-10-24 DIAGNOSIS — F17.200 NICOTINE DEPENDENCE, UNCOMPLICATED, UNSPECIFIED NICOTINE PRODUCT TYPE: ICD-10-CM

## 2023-10-24 PROCEDURE — 3052F HG A1C>EQUAL 8.0%<EQUAL 9.0%: CPT | Performed by: INTERNAL MEDICINE

## 2023-10-24 PROCEDURE — 3008F BODY MASS INDEX DOCD: CPT | Performed by: INTERNAL MEDICINE

## 2023-10-24 PROCEDURE — 3078F DIAST BP <80 MM HG: CPT | Performed by: INTERNAL MEDICINE

## 2023-10-24 PROCEDURE — 4010F ACE/ARB THERAPY RXD/TAKEN: CPT | Performed by: INTERNAL MEDICINE

## 2023-10-24 PROCEDURE — 99214 OFFICE O/P EST MOD 30 MIN: CPT | Performed by: INTERNAL MEDICINE

## 2023-10-24 PROCEDURE — 3074F SYST BP LT 130 MM HG: CPT | Performed by: INTERNAL MEDICINE

## 2023-10-24 RX ORDER — METFORMIN HYDROCHLORIDE 500 MG/1
500 TABLET ORAL 2 TIMES DAILY
Qty: 60 TABLET | Refills: 1 | Status: SHIPPED | OUTPATIENT
Start: 2023-10-24 | End: 2023-11-21 | Stop reason: SDUPTHER

## 2023-10-24 ASSESSMENT — ENCOUNTER SYMPTOMS
COUGH: 1
WHEEZING: 1
SHORTNESS OF BREATH: 1

## 2023-10-24 NOTE — PROGRESS NOTES
Subjective   Patient ID: Kamilah Manzo is a 63 y.o. female who presents for Med Management, Diabetes Mellitus, Nasal Congestion, Cough, Shortness of Breath, and Wheezing.  Cough  Associated symptoms include shortness of breath and wheezing.   Shortness of Breath  Associated symptoms include wheezing.   Wheezing   Associated symptoms include coughing and shortness of breath.     Follow up    Cough productive discolored sputum clearing still on antibiotics  Some wheezing  No fever or dyspnea  Acute exac asthma  Finish zithromax 500 mg daily  Tessalon perles as directed  Risk / benefits rev'd  Continue nebs 3 a day  Rest increase fluids  Consider medrol dose milan if not getting better with treatment       h/o chest pain, htn          Under stress          Cardio follow up with stress test neg             DM -2           HBA1C 9  9-23          on metformin tolerating well          Mild abd discomfort          Take with dinner instead of few crackers          Follow bs 131 this am          Readings rev'd acceptable and scanned          Pt declined nutrition consult              hypertension better on rx no side effects          Home bp's acceptable and scanned          Paraesophageal hernia       Surgical consult     h/o COVID  no residuals     asthma  on rx no side effects     Smoking cessation discussed     hypercholesterolemia on rx no side effects      vit D stable on rx no side effects     diet / exercise rev'd     eyes and feet on follow up     check CT urogram not done (too $$$)  urology consult not done     follow up Dr Jac rose colonoscopy for h/o colon polyps not done yet     dexa past due     GYN on follow up     mammogram and labs ordered not done    Review of Systems   Respiratory:  Positive for cough, shortness of breath and wheezing.    All other systems reviewed and are negative.      Objective   /76   Pulse 92   Wt 111 kg (245 lb 3.2 oz)   SpO2 93%   BMI 39.64 kg/m²   Lab Results   Component  Value Date    WBC 10.7 09/06/2023    HGB 15.4 09/06/2023    HCT 48.7 (H) 09/06/2023     09/06/2023    CHOL 191 07/22/2023    TRIG 213 (H) 07/22/2023    HDL 41.6 07/22/2023    ALT 41 09/06/2023    AST 26 09/06/2023     09/06/2023    K 4.8 09/06/2023     09/06/2023    CREATININE 0.60 09/06/2023    BUN 10 09/06/2023    CO2 32 09/06/2023    TSH 1.40 09/06/2023    INR 1.0 07/21/2023    HGBA1C 9.0 (A) 09/06/2023           Physical Exam  Vitals reviewed.   Constitutional:       Appearance: Normal appearance. She is obese.   HENT:      Head: Normocephalic and atraumatic.      Mouth/Throat:      Pharynx: No posterior oropharyngeal erythema.   Eyes:      General: No scleral icterus.     Conjunctiva/sclera: Conjunctivae normal.      Pupils: Pupils are equal, round, and reactive to light.   Cardiovascular:      Rate and Rhythm: Normal rate and regular rhythm.      Heart sounds: Normal heart sounds.   Pulmonary:      Effort: No respiratory distress.      Breath sounds: No wheezing.   Abdominal:      General: Abdomen is flat. Bowel sounds are normal. There is no distension.      Palpations: Abdomen is soft. There is no mass.      Tenderness: There is no abdominal tenderness. There is no rebound.   Musculoskeletal:         General: Normal range of motion.      Cervical back: Normal range of motion and neck supple.   Skin:     General: Skin is warm and dry.   Neurological:      General: No focal deficit present.      Mental Status: She is alert and oriented to person, place, and time. Mental status is at baseline.   Psychiatric:         Mood and Affect: Mood normal.         Behavior: Behavior normal.         Thought Content: Thought content normal.         Judgment: Judgment normal.         Problem List Items Addressed This Visit             ICD-10-CM       Cardiac and Vasculature    Hypertension I10       Endocrine/Metabolic    Obese E66.9    Type 2 diabetes mellitus with hyperglycemia, without long-term current  use of insulin (CMS/[facility]) - Primary E11.65    Relevant Medications    metFORMIN (Glucophage) 500 mg tablet     Other Visit Diagnoses         Codes    Moderate asthma without complication, unspecified whether persistent     J45.909    Nicotine dependence, uncomplicated, unspecified nicotine product type     F17.200          Assessment/Plan       Follow up    Cough productive discolored sputum clearing still on antibiotics  Some wheezing  No fever or dyspnea  Acute exac asthma  Finish zithromax 500 mg daily  Tessalon perles as directed  Risk / benefits rev'd  Continue nebs 3 a day  Rest increase fluids  Consider medrol dose milan if not getting better with treatment       h/o chest pain, htn          Under stress          Cardio follow up with stress test neg             DM -2           HBA1C 9  9-23          on metformin tolerating well          Mild abd discomfort          Take with dinner instead of few crackers          Follow bs 131 this am          Readings rev'd acceptable and scanned          Pt declined nutrition consult              hypertension better on rx no side effects          Home bp's acceptable and scanned          Paraesophageal hernia       Surgical consult     h/o COVID  no residuals     asthma  on rx no side effects     Smoking cessation discussed     hypercholesterolemia on rx no side effects      vit D stable on rx no side effects     diet / exercise rev'd     eyes and feet on follow up     check CT urogram not done (too $$$)  urology consult not done     follow up Dr Jac rose colonoscopy for h/o colon polyps not done yet     dexa past due     GYN on follow up     mammogram and labs ordered not done    mammogram 12-21  dexa 8-20  colonoscopy 2013  GYN past due  CT lung cancer screening n/a  immunizations rev'd  BMI 39.6    Follow up 1 month

## 2023-10-25 DIAGNOSIS — J45.902 ASTHMA WITH STATUS ASTHMATICUS, UNSPECIFIED ASTHMA SEVERITY, UNSPECIFIED WHETHER PERSISTENT (HHS-HCC): ICD-10-CM

## 2023-10-25 RX ORDER — ALBUTEROL SULFATE 90 UG/1
2 AEROSOL, METERED RESPIRATORY (INHALATION) EVERY 4 HOURS PRN
Qty: 18 G | Refills: 2 | OUTPATIENT
Start: 2023-10-25

## 2023-11-01 DIAGNOSIS — E78.00 HYPERCHOLESTEROLEMIA: ICD-10-CM

## 2023-11-01 DIAGNOSIS — J45.902 ASTHMA WITH STATUS ASTHMATICUS, UNSPECIFIED ASTHMA SEVERITY, UNSPECIFIED WHETHER PERSISTENT (HHS-HCC): ICD-10-CM

## 2023-11-01 RX ORDER — ALBUTEROL SULFATE 90 UG/1
2 AEROSOL, METERED RESPIRATORY (INHALATION) EVERY 4 HOURS PRN
Qty: 18 G | Refills: 0 | Status: SHIPPED | OUTPATIENT
Start: 2023-11-01 | End: 2023-12-28 | Stop reason: SDUPTHER

## 2023-11-01 RX ORDER — ATORVASTATIN CALCIUM 40 MG/1
40 TABLET, FILM COATED ORAL DAILY
Qty: 90 TABLET | Refills: 0 | Status: SHIPPED | OUTPATIENT
Start: 2023-11-01 | End: 2024-01-31 | Stop reason: SDUPTHER

## 2023-11-21 ENCOUNTER — OFFICE VISIT (OUTPATIENT)
Dept: PRIMARY CARE | Facility: CLINIC | Age: 63
End: 2023-11-21
Payer: COMMERCIAL

## 2023-11-21 VITALS
DIASTOLIC BLOOD PRESSURE: 84 MMHG | WEIGHT: 245.6 LBS | BODY MASS INDEX: 39.71 KG/M2 | HEART RATE: 97 BPM | OXYGEN SATURATION: 94 % | SYSTOLIC BLOOD PRESSURE: 136 MMHG

## 2023-11-21 DIAGNOSIS — J45.902 ASTHMA WITH STATUS ASTHMATICUS, UNSPECIFIED ASTHMA SEVERITY, UNSPECIFIED WHETHER PERSISTENT (HHS-HCC): ICD-10-CM

## 2023-11-21 DIAGNOSIS — E11.65 TYPE 2 DIABETES MELLITUS WITH HYPERGLYCEMIA, WITHOUT LONG-TERM CURRENT USE OF INSULIN (MULTI): Primary | ICD-10-CM

## 2023-11-21 DIAGNOSIS — Z72.0 NICOTINE ABUSE: ICD-10-CM

## 2023-11-21 DIAGNOSIS — I10 HYPERTENSION, UNSPECIFIED TYPE: ICD-10-CM

## 2023-11-21 DIAGNOSIS — E66.01 CLASS 2 SEVERE OBESITY DUE TO EXCESS CALORIES WITH SERIOUS COMORBIDITY AND BODY MASS INDEX (BMI) OF 39.0 TO 39.9 IN ADULT (MULTI): ICD-10-CM

## 2023-11-21 PROCEDURE — 3079F DIAST BP 80-89 MM HG: CPT | Performed by: INTERNAL MEDICINE

## 2023-11-21 PROCEDURE — 99214 OFFICE O/P EST MOD 30 MIN: CPT | Performed by: INTERNAL MEDICINE

## 2023-11-21 PROCEDURE — 3008F BODY MASS INDEX DOCD: CPT | Performed by: INTERNAL MEDICINE

## 2023-11-21 PROCEDURE — 3052F HG A1C>EQUAL 8.0%<EQUAL 9.0%: CPT | Performed by: INTERNAL MEDICINE

## 2023-11-21 PROCEDURE — 3075F SYST BP GE 130 - 139MM HG: CPT | Performed by: INTERNAL MEDICINE

## 2023-11-21 PROCEDURE — 4010F ACE/ARB THERAPY RXD/TAKEN: CPT | Performed by: INTERNAL MEDICINE

## 2023-11-21 RX ORDER — METFORMIN HYDROCHLORIDE 500 MG/1
500 TABLET ORAL 2 TIMES DAILY
Qty: 180 TABLET | Refills: 0 | Status: SHIPPED | OUTPATIENT
Start: 2023-11-21 | End: 2023-12-18

## 2023-11-21 RX ORDER — LOSARTAN POTASSIUM 25 MG/1
25 TABLET ORAL DAILY
Qty: 90 TABLET | Refills: 0 | Status: SHIPPED | OUTPATIENT
Start: 2023-11-21 | End: 2024-02-19 | Stop reason: SDUPTHER

## 2023-11-21 NOTE — PROGRESS NOTES
Subjective   Patient ID: Kamilah Manzo is a 63 y.o. female who presents for Med Management, Diabetes Mellitus, and Asthma.  Asthma  Her past medical history is significant for asthma.     Follow up bs    Cough resolved       h/o chest pain, htn resolved          Under stress          Cardio follow up with stress test neg             DM -2           HBA1C 9  9-23          on metformin tolerating well          Take with dinner instead of few crackers          Follow bs 135 this am          Readings rev'd acceptable and scanned          Pt declined nutrition consult              hypertension better on rx no side effects          Home bp's acceptable and scanned          Paraesophageal hernia       Surgical consult     h/o COVID  no residuals     asthma  on rx no side effects     Smoking cessation discussed     hypercholesterolemia on rx no side effects      vit D stable on rx no side effects     diet / exercise rev'd     eyes and feet on follow up     check CT urogram not done (too $$$)  urology consult not done     follow up Dr Jac rose colonoscopy for h/o colon polyps not done yet     dexa past due     GYN on follow up     mammogram ordered not done    Review of Systems   All other systems reviewed and are negative.      Objective   /84   Pulse 97   Wt 111 kg (245 lb 9.6 oz)   SpO2 94%   BMI 39.71 kg/m²   Lab Results   Component Value Date    WBC 10.7 09/06/2023    HGB 15.4 09/06/2023    HCT 48.7 (H) 09/06/2023     09/06/2023    CHOL 191 07/22/2023    TRIG 213 (H) 07/22/2023    HDL 41.6 07/22/2023    ALT 41 09/06/2023    AST 26 09/06/2023     09/06/2023    K 4.8 09/06/2023     09/06/2023    CREATININE 0.60 09/06/2023    BUN 10 09/06/2023    CO2 32 09/06/2023    TSH 1.40 09/06/2023    INR 1.0 07/21/2023    HGBA1C 9.0 (A) 09/06/2023           Physical Exam  Vitals reviewed.   Constitutional:       Appearance: Normal appearance. She is obese.   HENT:      Head: Normocephalic and atraumatic.       Mouth/Throat:      Pharynx: No posterior oropharyngeal erythema.   Eyes:      General: No scleral icterus.     Conjunctiva/sclera: Conjunctivae normal.      Pupils: Pupils are equal, round, and reactive to light.   Cardiovascular:      Rate and Rhythm: Normal rate and regular rhythm.      Heart sounds: Normal heart sounds.   Pulmonary:      Effort: No respiratory distress.      Breath sounds: No wheezing.   Abdominal:      General: Abdomen is flat. Bowel sounds are normal. There is no distension.      Palpations: Abdomen is soft. There is no mass.      Tenderness: There is no abdominal tenderness. There is no rebound.   Musculoskeletal:         General: Normal range of motion.      Cervical back: Normal range of motion and neck supple.   Skin:     General: Skin is warm and dry.   Neurological:      General: No focal deficit present.      Mental Status: She is alert and oriented to person, place, and time. Mental status is at baseline.   Psychiatric:         Mood and Affect: Mood normal.         Behavior: Behavior normal.         Thought Content: Thought content normal.         Judgment: Judgment normal.         Problem List Items Addressed This Visit             ICD-10-CM       Cardiac and Vasculature    Hypertension I10    Relevant Medications    losartan (Cozaar) 25 mg tablet       Endocrine/Metabolic    Obese E66.9    Type 2 diabetes mellitus with hyperglycemia, without long-term current use of insulin (CMS/[facility]) - Primary E11.65    Relevant Medications    metFORMIN (Glucophage) 500 mg tablet       Pulmonary and Pneumonias    Asthma with status asthmaticus, unspecified asthma severity, unspecified whether persistent J45.902     Other Visit Diagnoses         Codes    Nicotine abuse     Z72.0          Assessment/Plan   Follow up bs    Cough resolved       h/o chest pain, htn resolved          Under stress          Cardio follow up with stress test neg             DM -2           HBA1C 9  9-23          on  metformin tolerating well          Take with dinner instead of few crackers          Follow bs 135 this am          Readings rev'd acceptable and scanned          Pt declined nutrition consult              hypertension better on rx no side effects          Home bp's acceptable and scanned          Paraesophageal hernia       Surgical consult     h/o COVID  no residuals     asthma  on rx no side effects     Smoking cessation discussed     hypercholesterolemia on rx no side effects      vit D stable on rx no side effects     diet / exercise rev'd     eyes and feet on follow up     check CT urogram not done (too $$$)  urology consult not done     follow up Dr Nathan re colonoscopy for h/o colon polyps not done yet     dexa past due     GYN on follow up     mammogram ordered not done  mammogram 12-21  dexa 8-20  colonoscopy 2013  GYN past due  CT lung cancer screening n/a  immunizations rev'd  BMI 39.7    Follow up 3 months

## 2023-12-16 DIAGNOSIS — E11.65 TYPE 2 DIABETES MELLITUS WITH HYPERGLYCEMIA, WITHOUT LONG-TERM CURRENT USE OF INSULIN (MULTI): ICD-10-CM

## 2023-12-18 RX ORDER — METFORMIN HYDROCHLORIDE 500 MG/1
TABLET ORAL
Qty: 90 TABLET | Refills: 0 | Status: SHIPPED | OUTPATIENT
Start: 2023-12-18 | End: 2024-02-19 | Stop reason: SDUPTHER

## 2023-12-28 DIAGNOSIS — J45.902 ASTHMA WITH STATUS ASTHMATICUS, UNSPECIFIED ASTHMA SEVERITY, UNSPECIFIED WHETHER PERSISTENT (HHS-HCC): ICD-10-CM

## 2023-12-29 RX ORDER — ALBUTEROL SULFATE 90 UG/1
2 AEROSOL, METERED RESPIRATORY (INHALATION) EVERY 4 HOURS PRN
Qty: 90 G | Refills: 0 | Status: SHIPPED | OUTPATIENT
Start: 2023-12-29 | End: 2024-02-19 | Stop reason: SDUPTHER

## 2024-01-02 NOTE — TELEPHONE ENCOUNTER
Patient's insurance is denying the Albuterol 90 mcg/ actuation inhaler.  Is there another inhaler that can be prescribed in place of this or would you like a Prior Authorization to be done?    Please advise further instructions

## 2024-01-18 ENCOUNTER — TELEMEDICINE (OUTPATIENT)
Dept: PRIMARY CARE | Facility: CLINIC | Age: 64
End: 2024-01-18
Payer: COMMERCIAL

## 2024-01-18 ENCOUNTER — TELEPHONE (OUTPATIENT)
Dept: PRIMARY CARE | Facility: CLINIC | Age: 64
End: 2024-01-18
Payer: COMMERCIAL

## 2024-01-18 DIAGNOSIS — E11.65 TYPE 2 DIABETES MELLITUS WITH HYPERGLYCEMIA, WITHOUT LONG-TERM CURRENT USE OF INSULIN (MULTI): ICD-10-CM

## 2024-01-18 DIAGNOSIS — J44.1 CHRONIC OBSTRUCTIVE PULMONARY DISEASE WITH (ACUTE) EXACERBATION (MULTI): ICD-10-CM

## 2024-01-18 DIAGNOSIS — E66.01 CLASS 2 SEVERE OBESITY DUE TO EXCESS CALORIES WITH SERIOUS COMORBIDITY AND BODY MASS INDEX (BMI) OF 39.0 TO 39.9 IN ADULT (MULTI): ICD-10-CM

## 2024-01-18 DIAGNOSIS — Z72.0 NICOTINE ABUSE: ICD-10-CM

## 2024-01-18 DIAGNOSIS — I10 HYPERTENSION, UNSPECIFIED TYPE: ICD-10-CM

## 2024-01-18 DIAGNOSIS — U07.1 COVID-19: Primary | ICD-10-CM

## 2024-01-18 PROCEDURE — 99214 OFFICE O/P EST MOD 30 MIN: CPT | Performed by: INTERNAL MEDICINE

## 2024-01-18 RX ORDER — METHYLPREDNISOLONE 4 MG/1
TABLET ORAL
Qty: 21 TABLET | Refills: 0 | Status: SHIPPED | OUTPATIENT
Start: 2024-01-18 | End: 2024-01-25

## 2024-01-18 RX ORDER — AZITHROMYCIN 500 MG/1
500 TABLET, FILM COATED ORAL DAILY
Qty: 10 TABLET | Refills: 0 | Status: SHIPPED | OUTPATIENT
Start: 2024-01-18 | End: 2024-01-28

## 2024-01-18 NOTE — TELEPHONE ENCOUNTER
Tested positive yesterday for Covid. Has body aches, fatigued, stuffy nose, sore throat. Virtual? Please advise.

## 2024-01-18 NOTE — PROGRESS NOTES
Subjective   Patient ID: Kamilah Manzo is a 63 y.o. female who presents for COVID +    HPI    Tele visit    Same day sick    Sinus drainage, tired x yesterday  Chest congestion, body aches  No change in breathing  COVID + yesterday  Risk / benefits of meds rev'd / pt declined  Zithromax 500 mg daily  Medrol dose milan as directed  Risk / benefits rev'd  Rest increase fluids  Nebs tid  Has p ox at home needs to be above 90%  Otherwise to ER stat     h/o chest pain, htn resolved          Under stress          Cardio follow up with stress test neg             DM -2           HBA1C 9  9-23          on metformin tolerating well          Follow bs 134 this am              hypertension better on rx no side effects                   Paraesophageal hernia          Surgical consult / not done yet     asthma  on rx no side effects     Smoking cessation discussed     hypercholesterolemia on rx no side effects      vit D stable on rx no side effects     diet / exercise rev'd     eyes and feet on follow up     check CT urogram not done (too $$$)  urology consult not done     follow up Dr Jac rose colonoscopy for h/o colon polyps not done yet     dexa past due     GYN on follow up    Review of Systems   All other systems reviewed and are negative.      Objective   There were no vitals taken for this visit.  Lab Results   Component Value Date    WBC 10.7 09/06/2023    HGB 15.4 09/06/2023    HCT 48.7 (H) 09/06/2023     09/06/2023    CHOL 191 07/22/2023    TRIG 213 (H) 07/22/2023    HDL 41.6 07/22/2023    ALT 41 09/06/2023    AST 26 09/06/2023     09/06/2023    K 4.8 09/06/2023     09/06/2023    CREATININE 0.60 09/06/2023    BUN 10 09/06/2023    CO2 32 09/06/2023    TSH 1.40 09/06/2023    INR 1.0 07/21/2023    HGBA1C 9.0 (A) 09/06/2023           Physical Exam  Constitutional:       Appearance: She is obese. She is ill-appearing.   Pulmonary:      Effort: Pulmonary effort is normal.   Neurological:      Mental Status:  She is alert.   Psychiatric:         Mood and Affect: Mood normal.         Problem List Items Addressed This Visit             ICD-10-CM       Cardiac and Vasculature    Hypertension I10       Endocrine/Metabolic    Obese E66.9    Type 2 diabetes mellitus with hyperglycemia, without long-term current use of insulin (CMS/[facility]) E11.65       Infectious Diseases    COVID-19 - Primary U07.1    Relevant Medications    azithromycin (Zithromax) 500 mg tablet    methylPREDNISolone (Medrol Dospak) 4 mg tablets       Pulmonary and Pneumonias    Chronic obstructive pulmonary disease with (acute) exacerbation (CMS/[facility]) J44.1     Other Visit Diagnoses         Codes    Nicotine abuse     Z72.0          Assessment/Plan       Tele visit    Same day sick    Sinus drainage, tired x yesterday  Chest congestion, body aches  No change in breathing  COVID + yesterday  Risk / benefits of meds rev'd / pt declined  Zithromax 500 mg daily  Medrol dose milan as directed  Risk / benefits rev'd  Rest increase fluids  Nebs tid  Has p ox at home needs to be above 90%  Otherwise to ER stat     h/o chest pain, htn resolved          Under stress          Cardio follow up with stress test neg             DM -2           HBA1C 9  9-23          on metformin tolerating well          Follow bs 134 this am              hypertension better on rx no side effects                   Paraesophageal hernia          Surgical consult / not done yet     asthma  on rx no side effects     Smoking cessation discussed     hypercholesterolemia on rx no side effects      vit D stable on rx no side effects     diet / exercise rev'd     eyes and feet on follow up     check CT urogram not done (too $$$)  urology consult not done     follow up Dr Jac rose colonoscopy for h/o colon polyps not done yet     dexa past due     GYN on follow up  mammogram ordered not done  mammogram 12-21  dexa 8-20  colonoscopy 2013  GYN past due  CT lung cancer screening n/a  immunizations rev'd  BMI  39.7    Follow up as scheduled

## 2024-01-28 DIAGNOSIS — E78.00 HYPERCHOLESTEROLEMIA: ICD-10-CM

## 2024-01-31 RX ORDER — ATORVASTATIN CALCIUM 40 MG/1
40 TABLET, FILM COATED ORAL DAILY
Qty: 30 TABLET | Refills: 0 | Status: SHIPPED | OUTPATIENT
Start: 2024-01-31 | End: 2024-02-19 | Stop reason: SDUPTHER

## 2024-02-15 PROBLEM — R35.0 INCREASED FREQUENCY OF URINATION: Status: ACTIVE | Noted: 2024-02-15

## 2024-02-15 PROBLEM — J45.909 ASTHMATIC BRONCHITIS (HHS-HCC): Status: ACTIVE | Noted: 2023-06-09

## 2024-02-15 PROBLEM — M54.9 BACK PAIN: Status: ACTIVE | Noted: 2023-06-09

## 2024-02-15 PROBLEM — N81.10 FEMALE CYSTOCELE: Status: ACTIVE | Noted: 2024-02-15

## 2024-02-15 PROBLEM — M77.30 CALCANEAL SPUR: Status: ACTIVE | Noted: 2024-02-15

## 2024-02-15 PROBLEM — R07.9 CHEST PAIN: Status: ACTIVE | Noted: 2023-07-23

## 2024-02-15 PROBLEM — R56.9 SEIZURE (MULTI): Status: ACTIVE | Noted: 2024-02-15

## 2024-02-19 ENCOUNTER — OFFICE VISIT (OUTPATIENT)
Dept: PRIMARY CARE | Facility: CLINIC | Age: 64
End: 2024-02-19
Payer: COMMERCIAL

## 2024-02-19 VITALS
BODY MASS INDEX: 39.29 KG/M2 | SYSTOLIC BLOOD PRESSURE: 118 MMHG | HEART RATE: 79 BPM | DIASTOLIC BLOOD PRESSURE: 78 MMHG | WEIGHT: 243 LBS | OXYGEN SATURATION: 97 %

## 2024-02-19 DIAGNOSIS — E66.01 CLASS 2 SEVERE OBESITY DUE TO EXCESS CALORIES WITH SERIOUS COMORBIDITY AND BODY MASS INDEX (BMI) OF 39.0 TO 39.9 IN ADULT (MULTI): ICD-10-CM

## 2024-02-19 DIAGNOSIS — E78.00 HYPERCHOLESTEROLEMIA: ICD-10-CM

## 2024-02-19 DIAGNOSIS — E11.65 TYPE 2 DIABETES MELLITUS WITH HYPERGLYCEMIA, WITHOUT LONG-TERM CURRENT USE OF INSULIN (MULTI): Primary | ICD-10-CM

## 2024-02-19 DIAGNOSIS — I10 HYPERTENSION, UNSPECIFIED TYPE: ICD-10-CM

## 2024-02-19 DIAGNOSIS — E55.9 VITAMIN D DEFICIENCY: ICD-10-CM

## 2024-02-19 DIAGNOSIS — Z72.0 NICOTINE ABUSE: ICD-10-CM

## 2024-02-19 DIAGNOSIS — J45.902 ASTHMA WITH STATUS ASTHMATICUS, UNSPECIFIED ASTHMA SEVERITY, UNSPECIFIED WHETHER PERSISTENT (HHS-HCC): ICD-10-CM

## 2024-02-19 PROCEDURE — 3008F BODY MASS INDEX DOCD: CPT | Performed by: INTERNAL MEDICINE

## 2024-02-19 PROCEDURE — 3078F DIAST BP <80 MM HG: CPT | Performed by: INTERNAL MEDICINE

## 2024-02-19 PROCEDURE — 4010F ACE/ARB THERAPY RXD/TAKEN: CPT | Performed by: INTERNAL MEDICINE

## 2024-02-19 PROCEDURE — 3074F SYST BP LT 130 MM HG: CPT | Performed by: INTERNAL MEDICINE

## 2024-02-19 PROCEDURE — 99214 OFFICE O/P EST MOD 30 MIN: CPT | Performed by: INTERNAL MEDICINE

## 2024-02-19 RX ORDER — METFORMIN HYDROCHLORIDE 500 MG/1
TABLET ORAL
Qty: 180 TABLET | Refills: 0 | Status: SHIPPED | OUTPATIENT
Start: 2024-02-19 | End: 2024-05-21 | Stop reason: SDUPTHER

## 2024-02-19 RX ORDER — ALBUTEROL SULFATE 90 UG/1
2 AEROSOL, METERED RESPIRATORY (INHALATION) EVERY 4 HOURS PRN
Qty: 90 G | Refills: 0 | Status: SHIPPED | OUTPATIENT
Start: 2024-02-19 | End: 2024-04-03 | Stop reason: SDUPTHER

## 2024-02-19 RX ORDER — LOSARTAN POTASSIUM 25 MG/1
25 TABLET ORAL DAILY
Qty: 90 TABLET | Refills: 0 | Status: SHIPPED | OUTPATIENT
Start: 2024-02-19 | End: 2024-05-21 | Stop reason: SDUPTHER

## 2024-02-19 RX ORDER — ATORVASTATIN CALCIUM 40 MG/1
40 TABLET, FILM COATED ORAL DAILY
Qty: 90 TABLET | Refills: 0 | Status: SHIPPED | OUTPATIENT
Start: 2024-02-19 | End: 2024-05-21 | Stop reason: SDUPTHER

## 2024-02-19 RX ORDER — LOSARTAN POTASSIUM 25 MG/1
25 TABLET ORAL DAILY
Qty: 90 TABLET | Refills: 0 | Status: SHIPPED | OUTPATIENT
Start: 2024-02-19 | End: 2024-02-19

## 2024-02-19 NOTE — PROGRESS NOTES
Subjective   Patient ID: Kamilah Manzo is a 63 y.o. female who presents for Follow-up (3 mth/).  HPI      Routine follow up    Feels well    h/o chest pain, htn resolved          Under stress          Cardio follow up with stress test neg             DM -2           HBA1C 9  9-23          on metformin tolerating well          Follow bs 142 this am          Rev'd scanned better              hypertension better on rx no side effects                   Paraesophageal hernia          Surgical consult / not done yet     asthma  on rx no side effects     Smoking cessation discussed     hypercholesterolemia on rx no side effects      vit D stable on rx no side effects     diet / exercise rev'd     eyes and feet on follow up     check CT urogram not done (too $$$)  urology consult not done     follow up Dr Jac rose colonoscopy for h/o colon polyps not done yet     dexa past due    Review of Systems   All other systems reviewed and are negative.      Objective   /78   Pulse 79   Wt 110 kg (243 lb)   SpO2 97%   BMI 39.29 kg/m²   Lab Results   Component Value Date    WBC 10.7 09/06/2023    HGB 15.4 09/06/2023    HCT 48.7 (H) 09/06/2023     09/06/2023    CHOL 191 07/22/2023    TRIG 213 (H) 07/22/2023    HDL 41.6 07/22/2023    ALT 41 09/06/2023    AST 26 09/06/2023     09/06/2023    K 4.8 09/06/2023     09/06/2023    CREATININE 0.60 09/06/2023    BUN 10 09/06/2023    CO2 32 09/06/2023    TSH 1.40 09/06/2023    INR 1.0 07/21/2023    HGBA1C 9.0 (A) 09/06/2023           Physical Exam  Vitals reviewed.   Constitutional:       Appearance: Normal appearance. She is obese.   HENT:      Head: Normocephalic and atraumatic.      Mouth/Throat:      Pharynx: No posterior oropharyngeal erythema.   Eyes:      General: No scleral icterus.     Conjunctiva/sclera: Conjunctivae normal.      Pupils: Pupils are equal, round, and reactive to light.   Cardiovascular:      Rate and Rhythm: Normal rate and regular rhythm.       Heart sounds: Normal heart sounds.   Pulmonary:      Effort: No respiratory distress.      Breath sounds: No wheezing.   Abdominal:      General: Abdomen is flat. Bowel sounds are normal. There is no distension.      Palpations: Abdomen is soft. There is no mass.      Tenderness: There is no abdominal tenderness. There is no rebound.   Musculoskeletal:         General: Normal range of motion.      Cervical back: Normal range of motion and neck supple.   Skin:     General: Skin is warm and dry.   Neurological:      General: No focal deficit present.      Mental Status: She is alert and oriented to person, place, and time. Mental status is at baseline.   Psychiatric:         Mood and Affect: Mood normal.         Behavior: Behavior normal.         Thought Content: Thought content normal.         Judgment: Judgment normal.         Problem List Items Addressed This Visit             ICD-10-CM    Asthma with status asthmaticus, unspecified asthma severity, unspecified whether persistent J45.902    Relevant Medications    albuterol 90 mcg/actuation inhaler    Hypercholesterolemia E78.00    Relevant Medications    atorvastatin (Lipitor) 40 mg tablet    Hypertension I10    Relevant Medications    losartan (Cozaar) 25 mg tablet    Obese E66.9    Type 2 diabetes mellitus with hyperglycemia, without long-term current use of insulin (CMS/[facility]) - Primary E11.65    Relevant Medications    metFORMIN (Glucophage) 500 mg tablet    Vitamin D deficiency E55.9     Other Visit Diagnoses         Codes    Nicotine abuse     Z72.0          Assessment/Plan   Feels well    h/o chest pain, htn resolved          Under stress          Cardio follow up with stress test neg             DM -2           HBA1C 9  9-23          on metformin tolerating well          Follow bs 142 this am          Rev'd scanned better              hypertension better on rx no side effects                   Paraesophageal hernia          Surgical consult / not done  yet     asthma  on rx no side effects     Smoking cessation discussed     hypercholesterolemia on rx no side effects      vit D stable on rx no side effects     diet / exercise rev'd     eyes and feet on follow up     check CT urogram not done (too $$$)  urology consult not done     follow up Dr Nathan re colonoscopy for h/o colon polyps not done yet     mammogram 12-21  dexa 8-20  colonoscopy 2013  GYN past due  CT lung cancer screening n/a  immunizations rev'd  BMI 39.2    Wishes to do tests next time    Follow up 3 months / welcome to medicare

## 2024-04-03 DIAGNOSIS — J45.902 ASTHMA WITH STATUS ASTHMATICUS, UNSPECIFIED ASTHMA SEVERITY, UNSPECIFIED WHETHER PERSISTENT (HHS-HCC): ICD-10-CM

## 2024-04-03 RX ORDER — ALBUTEROL SULFATE 90 UG/1
2 AEROSOL, METERED RESPIRATORY (INHALATION) EVERY 4 HOURS PRN
Qty: 54 G | Refills: 0 | Status: SHIPPED | OUTPATIENT
Start: 2024-04-03 | End: 2024-07-02

## 2024-05-21 ENCOUNTER — OFFICE VISIT (OUTPATIENT)
Dept: PRIMARY CARE | Facility: CLINIC | Age: 64
End: 2024-05-21
Payer: COMMERCIAL

## 2024-05-21 VITALS
SYSTOLIC BLOOD PRESSURE: 126 MMHG | HEIGHT: 65 IN | BODY MASS INDEX: 40.95 KG/M2 | WEIGHT: 245.8 LBS | HEART RATE: 95 BPM | OXYGEN SATURATION: 96 % | DIASTOLIC BLOOD PRESSURE: 74 MMHG

## 2024-05-21 DIAGNOSIS — M81.0 OSTEOPOROSIS, UNSPECIFIED OSTEOPOROSIS TYPE, UNSPECIFIED PATHOLOGICAL FRACTURE PRESENCE: ICD-10-CM

## 2024-05-21 DIAGNOSIS — I10 HYPERTENSION, UNSPECIFIED TYPE: ICD-10-CM

## 2024-05-21 DIAGNOSIS — Z13.6 SCREENING FOR CARDIOVASCULAR CONDITION: ICD-10-CM

## 2024-05-21 DIAGNOSIS — Z12.31 ENCOUNTER FOR SCREENING MAMMOGRAM FOR MALIGNANT NEOPLASM OF BREAST: ICD-10-CM

## 2024-05-21 DIAGNOSIS — Z00.00 ROUTINE GENERAL MEDICAL EXAMINATION AT HEALTH CARE FACILITY: Primary | ICD-10-CM

## 2024-05-21 DIAGNOSIS — Z00.00 WELCOME TO MEDICARE PREVENTIVE VISIT: ICD-10-CM

## 2024-05-21 DIAGNOSIS — R53.83 OTHER FATIGUE: ICD-10-CM

## 2024-05-21 DIAGNOSIS — Z12.11 SCREENING FOR COLORECTAL CANCER: ICD-10-CM

## 2024-05-21 DIAGNOSIS — Z12.11 ENCOUNTER FOR SCREENING FOR MALIGNANT NEOPLASM OF COLON: ICD-10-CM

## 2024-05-21 DIAGNOSIS — E55.9 VITAMIN D DEFICIENCY: ICD-10-CM

## 2024-05-21 DIAGNOSIS — Z12.12 SCREENING FOR COLORECTAL CANCER: ICD-10-CM

## 2024-05-21 DIAGNOSIS — E78.00 HYPERCHOLESTEROLEMIA: ICD-10-CM

## 2024-05-21 DIAGNOSIS — E66.01 CLASS 3 SEVERE OBESITY DUE TO EXCESS CALORIES WITH SERIOUS COMORBIDITY AND BODY MASS INDEX (BMI) OF 40.0 TO 44.9 IN ADULT (MULTI): ICD-10-CM

## 2024-05-21 DIAGNOSIS — E11.69 TYPE 2 DIABETES MELLITUS WITH OTHER SPECIFIED COMPLICATION, WITHOUT LONG-TERM CURRENT USE OF INSULIN (MULTI): ICD-10-CM

## 2024-05-21 PROCEDURE — 1160F RVW MEDS BY RX/DR IN RCRD: CPT | Performed by: INTERNAL MEDICINE

## 2024-05-21 PROCEDURE — 99406 BEHAV CHNG SMOKING 3-10 MIN: CPT | Performed by: INTERNAL MEDICINE

## 2024-05-21 PROCEDURE — G0447 BEHAVIOR COUNSEL OBESITY 15M: HCPCS | Performed by: INTERNAL MEDICINE

## 2024-05-21 PROCEDURE — 99397 PER PM REEVAL EST PAT 65+ YR: CPT | Performed by: INTERNAL MEDICINE

## 2024-05-21 PROCEDURE — 3008F BODY MASS INDEX DOCD: CPT | Performed by: INTERNAL MEDICINE

## 2024-05-21 PROCEDURE — 1170F FXNL STATUS ASSESSED: CPT | Performed by: INTERNAL MEDICINE

## 2024-05-21 PROCEDURE — 1159F MED LIST DOCD IN RCRD: CPT | Performed by: INTERNAL MEDICINE

## 2024-05-21 PROCEDURE — 3074F SYST BP LT 130 MM HG: CPT | Performed by: INTERNAL MEDICINE

## 2024-05-21 PROCEDURE — G0402 INITIAL PREVENTIVE EXAM: HCPCS | Performed by: INTERNAL MEDICINE

## 2024-05-21 PROCEDURE — 1158F ADVNC CARE PLAN TLK DOCD: CPT | Performed by: INTERNAL MEDICINE

## 2024-05-21 PROCEDURE — 3078F DIAST BP <80 MM HG: CPT | Performed by: INTERNAL MEDICINE

## 2024-05-21 PROCEDURE — 99214 OFFICE O/P EST MOD 30 MIN: CPT | Performed by: INTERNAL MEDICINE

## 2024-05-21 PROCEDURE — 4010F ACE/ARB THERAPY RXD/TAKEN: CPT | Performed by: INTERNAL MEDICINE

## 2024-05-21 PROCEDURE — G0403 EKG FOR INITIAL PREVENT EXAM: HCPCS | Performed by: INTERNAL MEDICINE

## 2024-05-21 PROCEDURE — 1123F ACP DISCUSS/DSCN MKR DOCD: CPT | Performed by: INTERNAL MEDICINE

## 2024-05-21 RX ORDER — ATORVASTATIN CALCIUM 40 MG/1
40 TABLET, FILM COATED ORAL DAILY
Qty: 90 TABLET | Refills: 0 | Status: SHIPPED | OUTPATIENT
Start: 2024-05-21 | End: 2024-08-19

## 2024-05-21 RX ORDER — METFORMIN HYDROCHLORIDE 500 MG/1
TABLET ORAL
Qty: 180 TABLET | Refills: 0 | Status: SHIPPED | OUTPATIENT
Start: 2024-05-21

## 2024-05-21 RX ORDER — LOSARTAN POTASSIUM 25 MG/1
25 TABLET ORAL DAILY
Qty: 90 TABLET | Refills: 0 | Status: SHIPPED | OUTPATIENT
Start: 2024-05-21

## 2024-05-21 ASSESSMENT — PATIENT HEALTH QUESTIONNAIRE - PHQ9
1. LITTLE INTEREST OR PLEASURE IN DOING THINGS: NOT AT ALL
SUM OF ALL RESPONSES TO PHQ9 QUESTIONS 1 AND 2: 0
2. FEELING DOWN, DEPRESSED OR HOPELESS: NOT AT ALL

## 2024-05-21 ASSESSMENT — ENCOUNTER SYMPTOMS
DEPRESSION: 0
OCCASIONAL FEELINGS OF UNSTEADINESS: 0
LOSS OF SENSATION IN FEET: 0

## 2024-05-21 ASSESSMENT — ACTIVITIES OF DAILY LIVING (ADL)
MANAGING_FINANCES: INDEPENDENT
DOING_HOUSEWORK: INDEPENDENT
GROCERY_SHOPPING: INDEPENDENT
BATHING: INDEPENDENT
TAKING_MEDICATION: INDEPENDENT
DRESSING: INDEPENDENT

## 2024-05-21 ASSESSMENT — VISUAL ACUITY
OD_CC: 20/20
OS_CC: 20/20

## 2024-05-22 RX ORDER — SODIUM PICOSULFATE, MAGNESIUM OXIDE, AND ANHYDROUS CITRIC ACID 12; 3.5; 1 G/175ML; G/175ML; MG/175ML
LIQUID ORAL
Qty: 350 ML | Refills: 0 | Status: SHIPPED | OUTPATIENT
Start: 2024-05-22

## 2024-05-22 NOTE — PROGRESS NOTES
"Subjective   Reason for Visit: Kamilah Manzo is an 64 y.o. female here for a Medicare Wellness visit / yearly physical and follow up    Past Medical, Surgical, and Family History reviewed and updated in chart.    Reviewed all medications by prescribing practitioner or clinical pharmacist (such as prescriptions, OTCs, herbal therapies and supplements) and documented in the medical record.    HPI    Welcome to medicare    Yearly physical    mammogram 12-21  dexa 8-20 penia  colonoscopy 2013 polyps  Dr Nathan  GYN n/a  CT lung cancer screening n/a  immunizations rev'd UTD  BMI 40.5    Follow up    Feels well     h/o chest pain, htn resolved          Under stress /           Cardio follow up with stress test neg             DM -2           HBA1C 9  9-23          on metformin tolerating well              hypertension better on rx no side effects                   Paraesophageal hernia          Surgical consult / not done yet     asthma  stable on rx no side effects     Smoking cessation discussed     hypercholesterolemia on rx no side effects      vit D stable on rx no side effects     diet / exercise rev'd     eyes and feet on follow up     check CT urogram not done (too $$$)  urology consult not done     follow up Dr Nathan re colonoscopy for h/o colon polyps not done yet    Patient Care Team:  Betty Flores MD as PCP - General     Review of Systems   All other systems reviewed and are negative.      Objective   Vitals:  /74   Pulse 95   Ht 1.657 m (5' 5.25\")   Wt 111 kg (245 lb 12.8 oz)   SpO2 96%   BMI 40.59 kg/m²       Physical Exam  Vitals reviewed.   Constitutional:       Appearance: Normal appearance. She is obese.   HENT:      Head: Normocephalic and atraumatic.      Mouth/Throat:      Pharynx: No posterior oropharyngeal erythema.   Eyes:      General: No scleral icterus.     Conjunctiva/sclera: Conjunctivae normal.      Pupils: Pupils are equal, round, and reactive to light. "   Cardiovascular:      Rate and Rhythm: Normal rate and regular rhythm.      Heart sounds: Normal heart sounds.   Pulmonary:      Effort: No respiratory distress.      Breath sounds: No wheezing.   Abdominal:      General: Abdomen is flat. Bowel sounds are normal. There is no distension.      Palpations: Abdomen is soft. There is no mass.      Tenderness: There is no abdominal tenderness. There is no rebound.   Musculoskeletal:         General: Normal range of motion.      Cervical back: Normal range of motion and neck supple.   Skin:     General: Skin is warm and dry.   Neurological:      General: No focal deficit present.      Mental Status: She is alert and oriented to person, place, and time. Mental status is at baseline.   Psychiatric:         Mood and Affect: Mood normal.         Behavior: Behavior normal.         Thought Content: Thought content normal.         Judgment: Judgment normal.         Assessment/Plan   Problem List Items Addressed This Visit       Hypercholesterolemia    Relevant Medications    atorvastatin (Lipitor) 40 mg tablet    Hypertension    Relevant Medications    losartan (Cozaar) 25 mg tablet    Obese    Vitamin D deficiency    Relevant Orders    Vitamin D 25-Hydroxy,Total (for eval of Vitamin D levels)     Other Visit Diagnoses       Routine general medical examination at health care facility    -  Primary    Relevant Orders    Comprehensive Metabolic Panel    Lipid Panel    TSH with reflex to Free T4 if abnormal    Welcome to Medicare preventive visit        Relevant Orders    ECG 12 lead (Clinic Performed) (Completed)    Type 2 diabetes mellitus with other specified complication, without long-term current use of insulin (Multi)        Relevant Medications    metFORMIN (Glucophage) 500 mg tablet    Other Relevant Orders    Hemoglobin A1C    Albumin , Urine Random    Other fatigue        Relevant Orders    CBC and Auto Differential    Osteoporosis, unspecified osteoporosis type,  unspecified pathological fracture presence        Relevant Orders    XR DEXA bone density    Encounter for screening mammogram for malignant neoplasm of breast        Relevant Orders    BI mammo bilateral screening tomosynthesis    Encounter for screening for malignant neoplasm of colon        Relevant Orders    Colonoscopy Screening; Average Risk Patient    Screening for cardiovascular condition              Welcome to medicare    Yearly physical    mammogram 12-21  dexa 8-20 penia  colonoscopy 2013 polyps  Dr Nathan  GYN n/a  CT lung cancer screening n/a  immunizations rev'd UTD  BMI 40.5    Follow up    Feels well     h/o chest pain, htn resolved          Under stress /           Cardio follow up with stress test neg          EKG SR 80 today             DM -2           HBA1C 9  9-23          on metformin tolerating well              hypertension better on rx no side effects                   Paraesophageal hernia          Surgical consult / not done yet     asthma  stable on rx no side effects     Smoking cessation discussed  smoking and alcohol  cessation discussed   I spent more  than 3 min but less than 10 min counselling pt about need for smoking / tobacco cessation  and how I can support efforts when pt is ready to quit      hypercholesterolemia on rx no side effects      vit D stable on rx no side effects     diet / exercise rev'd  I spent >15 minutes minutes face to face with individual  providing recommendations for nutrition choices and exercise plan to help achieve weight reduction.      eyes and feet on follow up     check CT urogram not done (too $$$)  urology consult not done     follow up Dr Nathan re colonoscopy for h/o colon polyps not done yet    Check labs, mammogram, dexa, colonoscopy    Follow up 3 months

## 2024-07-01 PROCEDURE — RXMED WILLOW AMBULATORY MEDICATION CHARGE

## 2024-07-16 ENCOUNTER — PHARMACY VISIT (OUTPATIENT)
Dept: PHARMACY | Facility: CLINIC | Age: 64
End: 2024-07-16
Payer: COMMERCIAL

## 2024-08-22 ENCOUNTER — APPOINTMENT (OUTPATIENT)
Dept: PRIMARY CARE | Facility: CLINIC | Age: 64
End: 2024-08-22
Payer: COMMERCIAL

## 2024-08-22 VITALS
WEIGHT: 234.8 LBS | DIASTOLIC BLOOD PRESSURE: 78 MMHG | HEART RATE: 90 BPM | TEMPERATURE: 98.5 F | BODY MASS INDEX: 38.77 KG/M2 | OXYGEN SATURATION: 96 % | SYSTOLIC BLOOD PRESSURE: 108 MMHG

## 2024-08-22 DIAGNOSIS — E66.01 CLASS 2 SEVERE OBESITY DUE TO EXCESS CALORIES WITH SERIOUS COMORBIDITY AND BODY MASS INDEX (BMI) OF 38.0 TO 38.9 IN ADULT (MULTI): ICD-10-CM

## 2024-08-22 DIAGNOSIS — F17.200 NICOTINE DEPENDENCE, UNCOMPLICATED, UNSPECIFIED NICOTINE PRODUCT TYPE: ICD-10-CM

## 2024-08-22 DIAGNOSIS — E11.69 TYPE 2 DIABETES MELLITUS WITH OTHER SPECIFIED COMPLICATION, WITHOUT LONG-TERM CURRENT USE OF INSULIN (MULTI): Primary | ICD-10-CM

## 2024-08-22 DIAGNOSIS — R56.9 SEIZURE (MULTI): ICD-10-CM

## 2024-08-22 DIAGNOSIS — E55.9 VITAMIN D DEFICIENCY: ICD-10-CM

## 2024-08-22 DIAGNOSIS — E78.00 HYPERCHOLESTEROLEMIA: ICD-10-CM

## 2024-08-22 DIAGNOSIS — J45.909 MODERATE ASTHMA WITHOUT COMPLICATION, UNSPECIFIED WHETHER PERSISTENT (HHS-HCC): ICD-10-CM

## 2024-08-22 DIAGNOSIS — I10 HYPERTENSION, UNSPECIFIED TYPE: ICD-10-CM

## 2024-08-22 PROCEDURE — 1160F RVW MEDS BY RX/DR IN RCRD: CPT | Performed by: INTERNAL MEDICINE

## 2024-08-22 PROCEDURE — 99214 OFFICE O/P EST MOD 30 MIN: CPT | Performed by: INTERNAL MEDICINE

## 2024-08-22 PROCEDURE — 3074F SYST BP LT 130 MM HG: CPT | Performed by: INTERNAL MEDICINE

## 2024-08-22 PROCEDURE — 4010F ACE/ARB THERAPY RXD/TAKEN: CPT | Performed by: INTERNAL MEDICINE

## 2024-08-22 PROCEDURE — 3078F DIAST BP <80 MM HG: CPT | Performed by: INTERNAL MEDICINE

## 2024-08-22 PROCEDURE — 1159F MED LIST DOCD IN RCRD: CPT | Performed by: INTERNAL MEDICINE

## 2024-08-22 RX ORDER — ALBUTEROL SULFATE 90 UG/1
2 INHALANT RESPIRATORY (INHALATION) EVERY 4 HOURS PRN
Qty: 54 G | Refills: 0 | Status: SHIPPED | OUTPATIENT
Start: 2024-08-22 | End: 2024-11-20

## 2024-08-22 RX ORDER — METFORMIN HYDROCHLORIDE 500 MG/1
TABLET ORAL
Qty: 180 TABLET | Refills: 0 | Status: SHIPPED | OUTPATIENT
Start: 2024-08-22

## 2024-08-22 RX ORDER — ATORVASTATIN CALCIUM 40 MG/1
40 TABLET, FILM COATED ORAL DAILY
Qty: 90 TABLET | Refills: 0 | Status: SHIPPED | OUTPATIENT
Start: 2024-08-22 | End: 2024-11-20

## 2024-08-22 RX ORDER — ALBUTEROL SULFATE 0.83 MG/ML
2.5 SOLUTION RESPIRATORY (INHALATION) EVERY 6 HOURS PRN
Qty: 75 ML | Refills: 0 | Status: SHIPPED | OUTPATIENT
Start: 2024-08-22 | End: 2024-11-20

## 2024-08-22 RX ORDER — LOSARTAN POTASSIUM 25 MG/1
25 TABLET ORAL DAILY
Qty: 90 TABLET | Refills: 0 | Status: SHIPPED | OUTPATIENT
Start: 2024-08-22

## 2024-08-22 NOTE — PROGRESS NOTES
Subjective   Patient ID: Kamilah Manzo is a 64 y.o. female who presents for 3 month follow up.  HPI               Routine follow up               Tearful from divorce             counseled            h/o chest pain, htn resolved          Under stress /           Cardio follow up with stress test neg          EKG SR 80  5-24             DM -2           HBA1C 9  9-23          on metformin tolerating well              hypertension better on rx no side effects                   Paraesophageal hernia          Surgical consult / not done yet     asthma  stable on rx no side effects     Smoking cessation discussed     hypercholesterolemia on rx no side effects      vit D stable on rx no side effects    H/o seizures in 20's  No cause found and samantha since     diet / exercise rev'd     eyes and feet on follow up     check CT urogram not done (too $$$)  urology consult not done     follow up Dr Jac rose colonoscopy for h/o colon polyps not done yet     Check labs, mammogram, dexa, colonoscopy / not done yet    Review of Systems   All other systems reviewed and are negative.      Objective   /78 Comment: wrist  Pulse 90   Temp 36.9 °C (98.5 °F)   Wt 107 kg (234 lb 12.8 oz)   SpO2 96%   BMI 38.77 kg/m²   Lab Results   Component Value Date    WBC 10.7 09/06/2023    HGB 15.4 09/06/2023    HCT 48.7 (H) 09/06/2023     09/06/2023    CHOL 191 07/22/2023    TRIG 213 (H) 07/22/2023    HDL 41.6 07/22/2023    ALT 41 09/06/2023    AST 26 09/06/2023     09/06/2023    K 4.8 09/06/2023     09/06/2023    CREATININE 0.60 09/06/2023    BUN 10 09/06/2023    CO2 32 09/06/2023    TSH 1.40 09/06/2023    INR 1.0 07/21/2023    HGBA1C 9.0 (A) 09/06/2023           Physical Exam  Vitals reviewed.   Constitutional:       Appearance: Normal appearance. She is obese.   HENT:      Head: Normocephalic and atraumatic.      Mouth/Throat:      Pharynx: No posterior oropharyngeal erythema.   Eyes:      General: No scleral  icterus.     Conjunctiva/sclera: Conjunctivae normal.      Pupils: Pupils are equal, round, and reactive to light.   Cardiovascular:      Rate and Rhythm: Normal rate and regular rhythm.      Heart sounds: Normal heart sounds.   Pulmonary:      Effort: No respiratory distress.      Breath sounds: No wheezing.   Abdominal:      General: Abdomen is flat. Bowel sounds are normal. There is no distension.      Palpations: Abdomen is soft. There is no mass.      Tenderness: There is no abdominal tenderness. There is no rebound.   Musculoskeletal:         General: Normal range of motion.      Cervical back: Normal range of motion and neck supple.   Skin:     General: Skin is warm and dry.   Neurological:      General: No focal deficit present.      Mental Status: She is alert and oriented to person, place, and time. Mental status is at baseline.   Psychiatric:         Mood and Affect: Mood normal.         Behavior: Behavior normal.         Thought Content: Thought content normal.         Judgment: Judgment normal.         Problem List Items Addressed This Visit             ICD-10-CM    Hypercholesterolemia E78.00    Relevant Medications    atorvastatin (Lipitor) 40 mg tablet    Hypertension I10    Relevant Medications    losartan (Cozaar) 25 mg tablet    Obese E66.9    Vitamin D deficiency E55.9    Seizure (Multi) R56.9     Other Visit Diagnoses         Codes    Type 2 diabetes mellitus with other specified complication, without long-term current use of insulin (Multi)    -  Primary E11.69    Relevant Medications    metFORMIN (Glucophage) 500 mg tablet    Moderate asthma without complication, unspecified whether persistent ([facility])     J45.909    Relevant Medications    albuterol 90 mcg/actuation inhaler    albuterol 2.5 mg /3 mL (0.083 %) nebulizer solution    Nicotine dependence, uncomplicated, unspecified nicotine product type     F17.200          Assessment/Plan                Tearful from divorce              counseled            h/o chest pain, htn resolved          Under stress /           Cardio follow up with stress test neg          EKG SR 80  5-24             DM -2           HBA1C 9  9-23          on metformin tolerating well              hypertension better on rx no side effects                   Paraesophageal hernia          Surgical consult / not done yet     asthma  stable on rx no side effects     Smoking cessation discussed     hypercholesterolemia on rx no side effects      vit D stable on rx no side effects    H/o seizures in 20's  No cause found and samantha since     diet / exercise rev'd     eyes and feet on follow up     check CT urogram not done (too $$$)  urology consult not done     follow up Dr Nathan re colonoscopy for h/o colon polyps not done yet     Check labs, mammogram, dexa, colonoscopy / not done yet    mammogram 12-21  dexa 8-20 penia  colonoscopy 2013 polyps  Dr Nathan  GYN n/a  CT lung cancer screening n/a  immunizations rev'd UTD  BMI 38.7    Follow up 3 months

## 2024-09-13 ENCOUNTER — HOSPITAL ENCOUNTER (OUTPATIENT)
Dept: RADIOLOGY | Facility: HOSPITAL | Age: 64
Discharge: HOME | End: 2024-09-13
Payer: MEDICARE

## 2024-09-13 VITALS — BODY MASS INDEX: 42.51 KG/M2 | WEIGHT: 231 LBS | HEIGHT: 62 IN

## 2024-09-13 DIAGNOSIS — Z12.31 ENCOUNTER FOR SCREENING MAMMOGRAM FOR MALIGNANT NEOPLASM OF BREAST: ICD-10-CM

## 2024-09-13 DIAGNOSIS — M81.0 OSTEOPOROSIS, UNSPECIFIED OSTEOPOROSIS TYPE, UNSPECIFIED PATHOLOGICAL FRACTURE PRESENCE: ICD-10-CM

## 2024-09-13 PROCEDURE — 77080 DXA BONE DENSITY AXIAL: CPT

## 2024-09-13 PROCEDURE — 77067 SCR MAMMO BI INCL CAD: CPT

## 2024-09-13 PROCEDURE — 77063 BREAST TOMOSYNTHESIS BI: CPT | Performed by: RADIOLOGY

## 2024-09-13 PROCEDURE — 77067 SCR MAMMO BI INCL CAD: CPT | Performed by: RADIOLOGY

## 2024-09-16 NOTE — RESULT ENCOUNTER NOTE
Please call the patient regarding her abnormal result.  Mild bone loss  Continue as is  Follow up as scheduled

## 2024-10-09 DIAGNOSIS — J45.909 MODERATE ASTHMA WITHOUT COMPLICATION, UNSPECIFIED WHETHER PERSISTENT (HHS-HCC): ICD-10-CM

## 2024-10-09 RX ORDER — ALBUTEROL SULFATE 90 UG/1
2 INHALANT RESPIRATORY (INHALATION) EVERY 4 HOURS PRN
Qty: 18 G | Refills: 1 | Status: SHIPPED | OUTPATIENT
Start: 2024-10-09

## 2024-10-17 ENCOUNTER — APPOINTMENT (OUTPATIENT)
Dept: PREADMISSION TESTING | Facility: HOSPITAL | Age: 64
End: 2024-10-17
Payer: MEDICARE

## 2024-11-05 ENCOUNTER — TELEMEDICINE (OUTPATIENT)
Dept: PRIMARY CARE | Facility: CLINIC | Age: 64
End: 2024-11-05
Payer: MEDICARE

## 2024-11-05 DIAGNOSIS — Z72.0 NICOTINE ABUSE: ICD-10-CM

## 2024-11-05 DIAGNOSIS — E66.813 CLASS 3 SEVERE OBESITY DUE TO EXCESS CALORIES WITH SERIOUS COMORBIDITY AND BODY MASS INDEX (BMI) OF 40.0 TO 44.9 IN ADULT: ICD-10-CM

## 2024-11-05 DIAGNOSIS — J45.909 MODERATE ASTHMA WITHOUT COMPLICATION, UNSPECIFIED WHETHER PERSISTENT (HHS-HCC): ICD-10-CM

## 2024-11-05 DIAGNOSIS — E11.69 TYPE 2 DIABETES MELLITUS WITH OTHER SPECIFIED COMPLICATION, WITHOUT LONG-TERM CURRENT USE OF INSULIN: ICD-10-CM

## 2024-11-05 DIAGNOSIS — J45.909 ACUTE ASTHMATIC BRONCHITIS (HHS-HCC): Primary | ICD-10-CM

## 2024-11-05 DIAGNOSIS — E66.01 CLASS 3 SEVERE OBESITY DUE TO EXCESS CALORIES WITH SERIOUS COMORBIDITY AND BODY MASS INDEX (BMI) OF 40.0 TO 44.9 IN ADULT: ICD-10-CM

## 2024-11-05 DIAGNOSIS — I10 HYPERTENSION, UNSPECIFIED TYPE: ICD-10-CM

## 2024-11-05 PROCEDURE — 1160F RVW MEDS BY RX/DR IN RCRD: CPT | Performed by: INTERNAL MEDICINE

## 2024-11-05 PROCEDURE — G2211 COMPLEX E/M VISIT ADD ON: HCPCS | Performed by: INTERNAL MEDICINE

## 2024-11-05 PROCEDURE — 1159F MED LIST DOCD IN RCRD: CPT | Performed by: INTERNAL MEDICINE

## 2024-11-05 PROCEDURE — 4010F ACE/ARB THERAPY RXD/TAKEN: CPT | Performed by: INTERNAL MEDICINE

## 2024-11-05 PROCEDURE — 99214 OFFICE O/P EST MOD 30 MIN: CPT | Performed by: INTERNAL MEDICINE

## 2024-11-05 RX ORDER — ALBUTEROL SULFATE 90 UG/1
2 INHALANT RESPIRATORY (INHALATION) EVERY 4 HOURS PRN
Qty: 18 G | Refills: 1 | Status: SHIPPED | OUTPATIENT
Start: 2024-11-05

## 2024-11-05 RX ORDER — BENZONATATE 100 MG/1
100 CAPSULE ORAL 3 TIMES DAILY PRN
Qty: 30 CAPSULE | Refills: 0 | Status: SHIPPED | OUTPATIENT
Start: 2024-11-05 | End: 2024-11-15

## 2024-11-05 RX ORDER — ALBUTEROL SULFATE 0.83 MG/ML
2.5 SOLUTION RESPIRATORY (INHALATION) EVERY 6 HOURS PRN
Qty: 75 ML | Refills: 0 | Status: SHIPPED | OUTPATIENT
Start: 2024-11-05 | End: 2025-02-03

## 2024-11-05 RX ORDER — METHYLPREDNISOLONE 4 MG/1
TABLET ORAL
Qty: 21 TABLET | Refills: 0 | Status: SHIPPED | OUTPATIENT
Start: 2024-11-05 | End: 2024-11-12

## 2024-11-05 RX ORDER — AMOXICILLIN AND CLAVULANATE POTASSIUM 875; 125 MG/1; MG/1
875 TABLET, FILM COATED ORAL 2 TIMES DAILY
Qty: 20 TABLET | Refills: 0 | Status: SHIPPED | OUTPATIENT
Start: 2024-11-05 | End: 2024-11-15

## 2024-11-05 NOTE — PROGRESS NOTES
Subjective   Patient ID: Kamilah Manzo is a 64 y.o. female who presents for sick visit    [facility]    Same day sick    Sinus infection x 2 weeks   COVID neg  Cough discolored sputum , dyspnea, mild wheezing  No fever  Acute asthmatic bronchitis  Augmentin 875 mg twice daily #20 no refill  Medrol Dosepak as directed  Albuterol inhaler /nebulizer 4 times daily as needed  Tessalon Perles as directed  Risk/benefits reviewed  Rest increase hydration      h/o chest pain, htn resolved          Under stress /           Cardio follow up with stress test neg          EKG SR 80  5-24             DM -2           HBA1C 9  9-23          on metformin tolerating well              hypertension better on rx no side effects                   Paraesophageal hernia          Surgical consult / not done yet     asthma  stable on rx no side effects     Smoking cessation discussed     hypercholesterolemia on rx no side effects      vit D stable on rx no side effects     H/o seizures in 20's  No cause found and samantha since     diet / exercise rev'd     eyes and feet on follow up     check CT urogram not done (too $$$)  urology consult not done     follow up Dr Jac rose colonoscopy for h/o colon polyps not done yet     Check labs, mammogram, dexa, colonoscopy / not done yet    Review of Systems   All other systems reviewed and are negative.      Objective   There were no vitals taken for this visit.  Lab Results   Component Value Date    WBC 10.7 09/06/2023    HGB 15.4 09/06/2023    HCT 48.7 (H) 09/06/2023     09/06/2023    CHOL 191 07/22/2023    TRIG 213 (H) 07/22/2023    HDL 41.6 07/22/2023    ALT 41 09/06/2023    AST 26 09/06/2023     09/06/2023    K 4.8 09/06/2023     09/06/2023    CREATININE 0.60 09/06/2023    BUN 10 09/06/2023    CO2 32 09/06/2023    TSH 1.40 09/06/2023    INR 1.0 07/21/2023    HGBA1C 9.0 (A) 09/06/2023           Physical Exam  Constitutional:       Appearance: Normal appearance. She  is obese.   Pulmonary:      Effort: Pulmonary effort is normal.   Neurological:      Mental Status: She is alert.   Psychiatric:         Mood and Affect: Mood normal.         Problem List Items Addressed This Visit             ICD-10-CM    Hypertension I10    Obese E66.9     Other Visit Diagnoses         Codes    Acute asthmatic bronchitis ([facility])    -  Primary J45.909    Relevant Medications    amoxicillin-pot clavulanate (Augmentin) 875-125 mg tablet    methylPREDNISolone (Medrol Dospak) 4 mg tablets    albuterol 2.5 mg /3 mL (0.083 %) nebulizer solution    albuterol 90 mcg/actuation inhaler    benzonatate (Tessalon) 100 mg capsule    Moderate asthma without complication, unspecified whether persistent ([facility])     J45.909    Type 2 diabetes mellitus with other specified complication, without long-term current use of insulin     E11.69    Nicotine abuse     Z72.0          Assessment/Plan       Tele health  [place] used due to poor audio from pt    Same day sick    Sinus infection x 2 weeks   COVID neg  Cough discolored sputum , dyspnea, mild wheezing  No fever  Acute asthmatic bronchitis  Augmentin 875 mg twice daily #20 no refill  Medrol Dosepak as directed  Albuterol inhaler /nebulizer 4 times daily as needed  Tessalon Perles as directed  Risk/benefits reviewed  Rest increase hydration      h/o chest pain, htn resolved          Under stress /           Cardio follow up with stress test neg          EKG SR 80  5-24             DM -2           HBA1C 9  9-23          on metformin tolerating well              hypertension better on rx no side effects                   Paraesophageal hernia          Surgical consult / not done yet     asthma  stable on rx no side effects     Smoking cessation discussed     hypercholesterolemia on rx no side effects      vit D stable on rx no side effects     H/o seizures in 20's  No cause found and samantha since     diet / exercise rev'd     eyes and feet on follow up     check  CT urogram not done (too $$$)  urology consult not done     follow up Dr Nathan re colonoscopy for h/o colon polyps not done yet     Check labs, mammogram, dexa, colonoscopy / not done yet    mammogram 12-21  dexa 8-20 penia  colonoscopy 2013 polyps  Dr Nathan  GYN n/a  CT lung cancer screening n/a  immunizations rev'd UTD  BMI 42.2    Follow up as needed / as scheduled

## 2024-11-07 ENCOUNTER — APPOINTMENT (OUTPATIENT)
Dept: GASTROENTEROLOGY | Facility: HOSPITAL | Age: 64
End: 2024-11-07
Payer: COMMERCIAL

## 2024-11-24 DIAGNOSIS — E78.00 HYPERCHOLESTEROLEMIA: ICD-10-CM

## 2024-11-27 RX ORDER — ATORVASTATIN CALCIUM 40 MG/1
40 TABLET, FILM COATED ORAL DAILY
Qty: 90 TABLET | Refills: 0 | Status: SHIPPED | OUTPATIENT
Start: 2024-11-27

## 2024-11-29 ENCOUNTER — LAB (OUTPATIENT)
Dept: LAB | Facility: LAB | Age: 64
End: 2024-11-29
Payer: MEDICARE

## 2024-11-29 DIAGNOSIS — Z00.00 ROUTINE GENERAL MEDICAL EXAMINATION AT HEALTH CARE FACILITY: ICD-10-CM

## 2024-11-29 DIAGNOSIS — E55.9 VITAMIN D DEFICIENCY: ICD-10-CM

## 2024-11-29 DIAGNOSIS — E11.69 TYPE 2 DIABETES MELLITUS WITH OTHER SPECIFIED COMPLICATION, WITHOUT LONG-TERM CURRENT USE OF INSULIN: ICD-10-CM

## 2024-11-29 DIAGNOSIS — R53.83 OTHER FATIGUE: ICD-10-CM

## 2024-11-29 LAB
25(OH)D3 SERPL-MCNC: 57 NG/ML (ref 30–100)
ALBUMIN SERPL BCP-MCNC: 4 G/DL (ref 3.4–5)
ALP SERPL-CCNC: 81 U/L (ref 33–136)
ALT SERPL W P-5'-P-CCNC: 34 U/L (ref 7–45)
ANION GAP SERPL CALC-SCNC: 13 MMOL/L (ref 10–20)
AST SERPL W P-5'-P-CCNC: 16 U/L (ref 9–39)
BASOPHILS # BLD AUTO: 0.03 X10*3/UL (ref 0–0.1)
BASOPHILS NFR BLD AUTO: 0.4 %
BILIRUB SERPL-MCNC: 0.6 MG/DL (ref 0–1.2)
BUN SERPL-MCNC: 12 MG/DL (ref 6–23)
CALCIUM SERPL-MCNC: 9.3 MG/DL (ref 8.6–10.3)
CHLORIDE SERPL-SCNC: 101 MMOL/L (ref 98–107)
CHOLEST SERPL-MCNC: 153 MG/DL (ref 0–199)
CHOLESTEROL/HDL RATIO: 3.8
CO2 SERPL-SCNC: 30 MMOL/L (ref 21–32)
CREAT SERPL-MCNC: 0.61 MG/DL (ref 0.5–1.05)
CREAT UR-MCNC: 149.9 MG/DL (ref 20–320)
EGFRCR SERPLBLD CKD-EPI 2021: >90 ML/MIN/1.73M*2
EOSINOPHIL # BLD AUTO: 0.35 X10*3/UL (ref 0–0.7)
EOSINOPHIL NFR BLD AUTO: 4.5 %
ERYTHROCYTE [DISTWIDTH] IN BLOOD BY AUTOMATED COUNT: 13.1 % (ref 11.5–14.5)
EST. AVERAGE GLUCOSE BLD GHB EST-MCNC: 214 MG/DL
GLUCOSE SERPL-MCNC: 214 MG/DL (ref 74–99)
HBA1C MFR BLD: 9.1 %
HCT VFR BLD AUTO: 46.4 % (ref 36–46)
HDLC SERPL-MCNC: 40.3 MG/DL
HGB BLD-MCNC: 14.6 G/DL (ref 12–16)
IMM GRANULOCYTES # BLD AUTO: 0.03 X10*3/UL (ref 0–0.7)
IMM GRANULOCYTES NFR BLD AUTO: 0.4 % (ref 0–0.9)
LDLC SERPL CALC-MCNC: 70 MG/DL
LYMPHOCYTES # BLD AUTO: 2.51 X10*3/UL (ref 1.2–4.8)
LYMPHOCYTES NFR BLD AUTO: 32.6 %
MCH RBC QN AUTO: 30.2 PG (ref 26–34)
MCHC RBC AUTO-ENTMCNC: 31.5 G/DL (ref 32–36)
MCV RBC AUTO: 96 FL (ref 80–100)
MICROALBUMIN UR-MCNC: <7 MG/L
MICROALBUMIN/CREAT UR: NORMAL MG/G{CREAT}
MONOCYTES # BLD AUTO: 0.51 X10*3/UL (ref 0.1–1)
MONOCYTES NFR BLD AUTO: 6.6 %
NEUTROPHILS # BLD AUTO: 4.28 X10*3/UL (ref 1.2–7.7)
NEUTROPHILS NFR BLD AUTO: 55.5 %
NON HDL CHOLESTEROL: 113 MG/DL (ref 0–149)
NRBC BLD-RTO: 0 /100 WBCS (ref 0–0)
PLATELET # BLD AUTO: 333 X10*3/UL (ref 150–450)
POTASSIUM SERPL-SCNC: 4.4 MMOL/L (ref 3.5–5.3)
PROT SERPL-MCNC: 6.7 G/DL (ref 6.4–8.2)
RBC # BLD AUTO: 4.84 X10*6/UL (ref 4–5.2)
SODIUM SERPL-SCNC: 140 MMOL/L (ref 136–145)
TRIGL SERPL-MCNC: 213 MG/DL (ref 0–149)
TSH SERPL-ACNC: 1.39 MIU/L (ref 0.44–3.98)
VLDL: 43 MG/DL (ref 0–40)
WBC # BLD AUTO: 7.7 X10*3/UL (ref 4.4–11.3)

## 2024-11-29 PROCEDURE — 82043 UR ALBUMIN QUANTITATIVE: CPT

## 2024-11-29 PROCEDURE — 82306 VITAMIN D 25 HYDROXY: CPT

## 2024-11-29 PROCEDURE — 36415 COLL VENOUS BLD VENIPUNCTURE: CPT

## 2024-11-29 PROCEDURE — 83036 HEMOGLOBIN GLYCOSYLATED A1C: CPT

## 2024-11-29 PROCEDURE — 82570 ASSAY OF URINE CREATININE: CPT

## 2024-12-05 ENCOUNTER — APPOINTMENT (OUTPATIENT)
Dept: PRIMARY CARE | Facility: CLINIC | Age: 64
End: 2024-12-05
Payer: COMMERCIAL

## 2024-12-05 VITALS — OXYGEN SATURATION: 94 %

## 2024-12-05 DIAGNOSIS — E66.01 CLASS 3 SEVERE OBESITY DUE TO EXCESS CALORIES WITH SERIOUS COMORBIDITY AND BODY MASS INDEX (BMI) OF 40.0 TO 44.9 IN ADULT: ICD-10-CM

## 2024-12-05 DIAGNOSIS — E55.9 VITAMIN D DEFICIENCY: ICD-10-CM

## 2024-12-05 DIAGNOSIS — E66.813 CLASS 3 SEVERE OBESITY DUE TO EXCESS CALORIES WITH SERIOUS COMORBIDITY AND BODY MASS INDEX (BMI) OF 40.0 TO 44.9 IN ADULT: ICD-10-CM

## 2024-12-05 DIAGNOSIS — Z71.2 ENCOUNTER TO DISCUSS TEST RESULTS: Primary | ICD-10-CM

## 2024-12-05 DIAGNOSIS — I10 HYPERTENSION, UNSPECIFIED TYPE: ICD-10-CM

## 2024-12-05 DIAGNOSIS — E11.69 TYPE 2 DIABETES MELLITUS WITH OTHER SPECIFIED COMPLICATION, WITHOUT LONG-TERM CURRENT USE OF INSULIN: ICD-10-CM

## 2024-12-05 DIAGNOSIS — J45.901 MODERATE ASTHMA WITH ACUTE EXACERBATION, UNSPECIFIED WHETHER PERSISTENT (HHS-HCC): ICD-10-CM

## 2024-12-05 DIAGNOSIS — E78.00 HYPERCHOLESTEROLEMIA: ICD-10-CM

## 2024-12-05 DIAGNOSIS — K44.9 PARAESOPHAGEAL HERNIA: ICD-10-CM

## 2024-12-05 PROCEDURE — 3048F LDL-C <100 MG/DL: CPT | Performed by: INTERNAL MEDICINE

## 2024-12-05 PROCEDURE — 1159F MED LIST DOCD IN RCRD: CPT | Performed by: INTERNAL MEDICINE

## 2024-12-05 PROCEDURE — 99214 OFFICE O/P EST MOD 30 MIN: CPT | Performed by: INTERNAL MEDICINE

## 2024-12-05 PROCEDURE — G2211 COMPLEX E/M VISIT ADD ON: HCPCS | Performed by: INTERNAL MEDICINE

## 2024-12-05 PROCEDURE — 3062F POS MACROALBUMINURIA REV: CPT | Performed by: INTERNAL MEDICINE

## 2024-12-05 PROCEDURE — G0446 INTENS BEHAVE THER CARDIO DX: HCPCS | Performed by: INTERNAL MEDICINE

## 2024-12-05 PROCEDURE — 1160F RVW MEDS BY RX/DR IN RCRD: CPT | Performed by: INTERNAL MEDICINE

## 2024-12-05 PROCEDURE — 3046F HEMOGLOBIN A1C LEVEL >9.0%: CPT | Performed by: INTERNAL MEDICINE

## 2024-12-05 PROCEDURE — 4010F ACE/ARB THERAPY RXD/TAKEN: CPT | Performed by: INTERNAL MEDICINE

## 2024-12-05 RX ORDER — AMOXICILLIN AND CLAVULANATE POTASSIUM 875; 125 MG/1; MG/1
875 TABLET, FILM COATED ORAL 2 TIMES DAILY
Qty: 20 TABLET | Refills: 0 | Status: SHIPPED | OUTPATIENT
Start: 2024-12-05 | End: 2024-12-15

## 2024-12-05 RX ORDER — PREDNISONE 10 MG/1
TABLET ORAL
Qty: 42 TABLET | Refills: 0 | Status: SHIPPED | OUTPATIENT
Start: 2024-12-05 | End: 2024-12-17

## 2024-12-05 RX ORDER — BUDESONIDE AND FORMOTEROL FUMARATE DIHYDRATE 160; 4.5 UG/1; UG/1
2 AEROSOL RESPIRATORY (INHALATION)
Qty: 10.2 G | Refills: 2 | Status: SHIPPED | OUTPATIENT
Start: 2024-12-05

## 2024-12-05 RX ORDER — BENZONATATE 100 MG/1
100 CAPSULE ORAL 3 TIMES DAILY PRN
Qty: 30 CAPSULE | Refills: 0 | Status: SHIPPED | OUTPATIENT
Start: 2024-12-05 | End: 2024-12-15

## 2024-12-05 RX ORDER — ALBUTEROL SULFATE 0.83 MG/ML
2.5 SOLUTION RESPIRATORY (INHALATION) EVERY 6 HOURS PRN
Qty: 75 ML | Refills: 1 | Status: SHIPPED | OUTPATIENT
Start: 2024-12-05 | End: 2025-03-05

## 2024-12-05 NOTE — PROGRESS NOTES
Subjective   Patient ID: Kamilah Manzo is a 64 y.o. female who presents for Follow-up (3 month ).  [facility] [place]      Tele health    [place] used     Routine follow up               Labs rev'd               Cough productive and discolored x yesterday             Tired             No fever or dyspnea             Moderate asthma with acute exacerbation             Augmentin 875 mg twice daily #20 no refill             Prednisone taper as directed             Tessalon Perles as directed as needed             Use Symbicort 2 puffs twice daily every day             (patient was using as needed)             Albuterol nebulizers as needed             Rest increase hydration           h/o chest pain, htn resolved          Under stress /           Cardio follow up with stress test neg          EKG SR 80  5-24             DM -2 uncontrolled          HBA1C 9.1  11-24          on metformin tolerating well          Add jardiance 25 mg daily          Risk / benefits rev'd              hypertension better on rx no side effects                   Paraesophageal hernia          Surgical consult / not done yet     asthma on rx no side effects     Smoking cessation discussed  quit     hypercholesterolemia on rx no side effects      vit D stable on rx no side effects     H/o seizures in 20's  No cause found and samantha since     diet / exercise rev'd     eyes and feet on follow up     check CT urogram not done (too $$$)  urology consult not done     follow up Dr Jac rose colonoscopy for h/o colon polyps not done yet     Check colonoscopy / not done yet    Review of Systems   All other systems reviewed and are negative.      Objective   SpO2 94%   Lab Results   Component Value Date    WBC 7.7 11/29/2024    HGB 14.6 11/29/2024    HCT 46.4 (H) 11/29/2024     11/29/2024    CHOL 153 11/29/2024    TRIG 213 (H) 11/29/2024    HDL 40.3 11/29/2024    ALT 34 11/29/2024    AST 16 11/29/2024     11/29/2024    K 4.4 11/29/2024      11/29/2024    CREATININE 0.61 11/29/2024    BUN 12 11/29/2024    CO2 30 11/29/2024    TSH 1.39 11/29/2024    INR 1.0 07/21/2023    HGBA1C 9.1 (H) 11/29/2024           Physical Exam  Constitutional:       Appearance: Normal appearance. She is obese.   Pulmonary:      Effort: Pulmonary effort is normal.   Neurological:      Mental Status: She is alert.   Psychiatric:         Mood and Affect: Mood normal.         Problem List Items Addressed This Visit             ICD-10-CM    Hypercholesterolemia E78.00    Hypertension I10    Obese E66.9    Vitamin D deficiency E55.9     Other Visit Diagnoses         Codes    Encounter to discuss test results    -  Primary Z71.2    Type 2 diabetes mellitus with other specified complication, without long-term current use of insulin     E11.69    Relevant Medications    empagliflozin (Jardiance) 25 mg    Moderate asthma with acute exacerbation, unspecified whether persistent ([facility])     J45.901    Relevant Medications    amoxicillin-pot clavulanate (Augmentin) 875-125 mg tablet    albuterol 2.5 mg /3 mL (0.083 %) nebulizer solution    benzonatate (Tessalon) 100 mg capsule    budesonide-formoteroL (Symbicort) 160-4.5 mcg/actuation inhaler    predniSONE (Deltasone) 10 mg tablet    Paraesophageal hernia     K44.9          Assessment/Plan     Tele health    [place] used     Routine follow up               Labs rev'd               Cough productive and discolored x yesterday             Tired             No fever or dyspnea             Moderate asthma with acute exacerbation             Augmentin 875 mg twice daily #20 no refill             Prednisone taper as directed             Tessalon Perles as directed as needed             Use Symbicort 2 puffs twice daily every day             (patient was using as needed)             Albuterol nebulizers as needed             Rest increase hydration           h/o chest pain, htn resolved          Under stress /           Cardio follow up  with stress test neg          EKG SR 80  5-24             DM -2 uncontrolled          HBA1C 9.1  11-24          on metformin tolerating well          Add jardiance 25 mg daily          Risk / benefits rev'd              hypertension better on rx no side effects                   Paraesophageal hernia          Surgical consult / not done yet     asthma on rx no side effects     Smoking cessation discussed  quit     hypercholesterolemia on rx no side effects  I spent 15 minutes face-to-face with this individual discussing their cardiovascular risk and behavioral therapies of nutritional choices, exercise, and elimination of habits contributing to risk. We agreed on plan how they may be able to reduce their current cardiovascular risk.  Patient 10 year cardiac risk estimate calculates : 17.9 %       vit D stable on rx no side effects     H/o seizures in 20's  No cause found and samantha since     diet / exercise rev'd     eyes and feet on follow up     check CT urogram not done (too $$$)  urology consult not done     follow up Dr Nathan re colonoscopy for h/o colon polyps not done yet     Check colonoscopy / not done yet    mammogram 9-24  dexa 9-24 penia  colonoscopy 2013 polyps  Dr Nathan  GYN n/a  CT lung cancer screening n/a  immunizations rev'd UTD  BMI 42.2    Follow up 6 weeks

## 2024-12-26 ENCOUNTER — TELEPHONE (OUTPATIENT)
Dept: PRIMARY CARE | Facility: CLINIC | Age: 64
End: 2024-12-26
Payer: MEDICARE

## 2024-12-26 NOTE — TELEPHONE ENCOUNTER
Patient was having a lot of side effects from Jardiance. She took for 3 weeks then stopped it. She has appt scheduled for 1/23. Asking if she should change to something else?

## 2025-01-23 ENCOUNTER — APPOINTMENT (OUTPATIENT)
Dept: PRIMARY CARE | Facility: CLINIC | Age: 65
End: 2025-01-23
Payer: MEDICARE

## 2025-01-23 VITALS
HEART RATE: 84 BPM | TEMPERATURE: 97.6 F | BODY MASS INDEX: 42.54 KG/M2 | DIASTOLIC BLOOD PRESSURE: 76 MMHG | WEIGHT: 232.6 LBS | OXYGEN SATURATION: 97 % | SYSTOLIC BLOOD PRESSURE: 120 MMHG

## 2025-01-23 DIAGNOSIS — E66.813 CLASS 3 SEVERE OBESITY DUE TO EXCESS CALORIES WITH SERIOUS COMORBIDITY AND BODY MASS INDEX (BMI) OF 40.0 TO 44.9 IN ADULT: ICD-10-CM

## 2025-01-23 DIAGNOSIS — R56.9 SEIZURE (MULTI): ICD-10-CM

## 2025-01-23 DIAGNOSIS — J45.909 MODERATE ASTHMA WITHOUT COMPLICATION, UNSPECIFIED WHETHER PERSISTENT (HHS-HCC): ICD-10-CM

## 2025-01-23 DIAGNOSIS — E66.01 CLASS 3 SEVERE OBESITY DUE TO EXCESS CALORIES WITH SERIOUS COMORBIDITY AND BODY MASS INDEX (BMI) OF 40.0 TO 44.9 IN ADULT: ICD-10-CM

## 2025-01-23 DIAGNOSIS — E78.00 HYPERCHOLESTEROLEMIA: ICD-10-CM

## 2025-01-23 DIAGNOSIS — E55.9 VITAMIN D DEFICIENCY: ICD-10-CM

## 2025-01-23 DIAGNOSIS — I10 HYPERTENSION, UNSPECIFIED TYPE: ICD-10-CM

## 2025-01-23 DIAGNOSIS — E11.69 TYPE 2 DIABETES MELLITUS WITH OTHER SPECIFIED COMPLICATION, WITHOUT LONG-TERM CURRENT USE OF INSULIN: Primary | ICD-10-CM

## 2025-01-23 PROBLEM — J44.1 CHRONIC OBSTRUCTIVE PULMONARY DISEASE WITH (ACUTE) EXACERBATION (MULTI): Status: RESOLVED | Noted: 2024-01-18 | Resolved: 2025-01-23

## 2025-01-23 PROCEDURE — 99214 OFFICE O/P EST MOD 30 MIN: CPT | Performed by: INTERNAL MEDICINE

## 2025-01-23 PROCEDURE — 3074F SYST BP LT 130 MM HG: CPT | Performed by: INTERNAL MEDICINE

## 2025-01-23 PROCEDURE — G2211 COMPLEX E/M VISIT ADD ON: HCPCS | Performed by: INTERNAL MEDICINE

## 2025-01-23 PROCEDURE — 4010F ACE/ARB THERAPY RXD/TAKEN: CPT | Performed by: INTERNAL MEDICINE

## 2025-01-23 PROCEDURE — 3078F DIAST BP <80 MM HG: CPT | Performed by: INTERNAL MEDICINE

## 2025-01-23 PROCEDURE — 1159F MED LIST DOCD IN RCRD: CPT | Performed by: INTERNAL MEDICINE

## 2025-01-23 PROCEDURE — 1160F RVW MEDS BY RX/DR IN RCRD: CPT | Performed by: INTERNAL MEDICINE

## 2025-01-23 RX ORDER — ATORVASTATIN CALCIUM 40 MG/1
40 TABLET, FILM COATED ORAL DAILY
Qty: 90 TABLET | Refills: 0 | Status: SHIPPED | OUTPATIENT
Start: 2025-01-23

## 2025-01-23 RX ORDER — METFORMIN HYDROCHLORIDE 500 MG/1
TABLET ORAL
Qty: 180 TABLET | Refills: 0 | Status: SHIPPED | OUTPATIENT
Start: 2025-01-23

## 2025-01-23 RX ORDER — LOSARTAN POTASSIUM 25 MG/1
25 TABLET ORAL DAILY
Qty: 90 TABLET | Refills: 0 | Status: SHIPPED | OUTPATIENT
Start: 2025-01-23

## 2025-01-23 NOTE — PROGRESS NOTES
Subjective   Patient ID: Kamilah Manzo is a 64 y.o. female who presents for Follow-up.  HPI    Routine follow up                Feels well            h/o chest pain, htn resolved          Under stress /           Cardio follow up with stress test neg          EKG SR 80  5-24             DM -2 uncontrolled          HBA1C 9.1  11-24          on metformin tolerating well          FBS  151 yesterday am          Jardiance caused genital redness / stopped              hypertension better on rx no side effects                   Paraesophageal hernia          Surgical consult / not done yet     asthma on rx no side effects     Smoking cessation discussed  quit     hypercholesterolemia on rx no side effects      vit D stable on rx no side effects     H/o seizures in 20's  No cause found and samantha since     diet / exercise rev'd     eyes and feet on follow up     check CT urogram not done (too $$$)  urology consult not done     follow up Dr Jac rose colonoscopy for h/o colon polyps not done yet     Check colonoscopy / not done yet    Review of Systems   All other systems reviewed and are negative.      Objective   /76   Pulse 84   Temp 36.4 °C (97.6 °F)   Wt 106 kg (232 lb 9.6 oz)   SpO2 97%   BMI 42.54 kg/m²   Lab Results   Component Value Date    WBC 7.7 11/29/2024    HGB 14.6 11/29/2024    HCT 46.4 (H) 11/29/2024     11/29/2024    CHOL 153 11/29/2024    TRIG 213 (H) 11/29/2024    HDL 40.3 11/29/2024    ALT 34 11/29/2024    AST 16 11/29/2024     11/29/2024    K 4.4 11/29/2024     11/29/2024    CREATININE 0.61 11/29/2024    BUN 12 11/29/2024    CO2 30 11/29/2024    TSH 1.39 11/29/2024    INR 1.0 07/21/2023    HGBA1C 9.1 (H) 11/29/2024           Physical Exam  Vitals reviewed.   Constitutional:       Appearance: Normal appearance. She is obese.   HENT:      Head: Normocephalic and atraumatic.      Mouth/Throat:      Pharynx: No posterior oropharyngeal erythema.   Eyes:      General: No  scleral icterus.     Conjunctiva/sclera: Conjunctivae normal.      Pupils: Pupils are equal, round, and reactive to light.   Cardiovascular:      Rate and Rhythm: Normal rate and regular rhythm.      Heart sounds: Normal heart sounds.   Pulmonary:      Effort: No respiratory distress.      Breath sounds: No wheezing.   Abdominal:      General: Abdomen is flat. Bowel sounds are normal. There is no distension.      Palpations: Abdomen is soft. There is no mass.      Tenderness: There is no abdominal tenderness. There is no rebound.   Musculoskeletal:         General: Normal range of motion.      Cervical back: Normal range of motion and neck supple.   Skin:     General: Skin is warm and dry.   Neurological:      General: No focal deficit present.      Mental Status: She is alert and oriented to person, place, and time. Mental status is at baseline.   Psychiatric:         Mood and Affect: Mood normal.         Behavior: Behavior normal.         Thought Content: Thought content normal.         Judgment: Judgment normal.         Problem List Items Addressed This Visit             ICD-10-CM    Hypercholesterolemia E78.00    Relevant Medications    atorvastatin (Lipitor) 40 mg tablet    Hypertension I10    Relevant Medications    losartan (Cozaar) 25 mg tablet    Obese E66.9    Vitamin D deficiency E55.9    Seizure (Multi) R56.9     Other Visit Diagnoses         Codes    Type 2 diabetes mellitus with other specified complication, without long-term current use of insulin    -  Primary E11.69    Relevant Medications    metFORMIN (Glucophage) 500 mg tablet    Moderate asthma without complication, unspecified whether persistent ([facility])     J45.909          Assessment/Plan                        Feels well            h/o chest pain, htn resolved          Under stress /           Cardio follow up with stress test neg          EKG SR 80  5-24             DM -2 uncontrolled          HBA1C 9.1  11-24          on metformin  tolerating well          FBS  151 yesterday am          Jardiance caused genital redness / stopped          Rev'd and scanned acceptable              hypertension better on rx no side effects                   Paraesophageal hernia          Surgical consult / not done yet     asthma on rx no side effects     Smoking cessation discussed  quit     hypercholesterolemia on rx no side effects      vit D stable on rx no side effects     H/o seizures in 20's  No cause found and samantha since     diet / exercise rev'd     eyes and feet on follow up     check CT urogram not done (too $$$)  urology consult not done     follow up Dr Nathan re colonoscopy for h/o colon polyps not done yet     colonoscopy  planned 2-25     mammogram 9-24  dexa 9-24 penia  colonoscopy 2013 polyps  Dr Nathan  GYN n/a  CT lung cancer screening n/a  immunizations rev'd UTD  BMI 42.5    Follow up 3 months / medicare physical

## 2025-01-30 ENCOUNTER — ANESTHESIA EVENT (OUTPATIENT)
Dept: GASTROENTEROLOGY | Facility: HOSPITAL | Age: 65
End: 2025-01-30
Payer: MEDICARE

## 2025-01-30 ENCOUNTER — PRE-ADMISSION TESTING (OUTPATIENT)
Dept: PREADMISSION TESTING | Facility: HOSPITAL | Age: 65
End: 2025-01-30
Payer: MEDICARE

## 2025-01-30 RX ORDER — UBIDECARENONE 30 MG
300 CAPSULE ORAL DAILY
COMMUNITY

## 2025-01-30 ASSESSMENT — DUKE ACTIVITY SCORE INDEX (DASI)
CAN YOU PARTICIPATE IN MODERATE RECREATIONAL ACTIVITIES LIKE GOLF, BOWLING, DANCING, DOUBLES TENNIS OR THROWING A BASEBALL OR FOOTBALL: YES
CAN YOU DO HEAVY WORK AROUND THE HOUSE LIKE SCRUBBING FLOORS OR LIFTING AND MOVING HEAVY FURNITURE: NO
CAN YOU WALK INDOORS, SUCH AS AROUND YOUR HOUSE: YES
CAN YOU RUN A SHORT DISTANCE: NO
CAN YOU CLIMB A FLIGHT OF STAIRS OR WALK UP A HILL: YES
CAN YOU DO MODERATE WORK AROUND THE HOUSE LIKE VACUUMING, SWEEPING FLOORS OR CARRYING GROCERIES: YES
CAN YOU DO LIGHT WORK AROUND THE HOUSE LIKE DUSTING OR WASHING DISHES: YES
TOTAL_SCORE: 24.95
CAN YOU TAKE CARE OF YOURSELF (EAT, DRESS, BATHE, OR USE TOILET): YES
CAN YOU WALK A BLOCK OR TWO ON LEVEL GROUND: YES
CAN YOU PARTICIPATE IN STRENOUS SPORTS LIKE SWIMMING, SINGLES TENNIS, FOOTBALL, BASKETBALL, OR SKIING: NO
DASI METS SCORE: 5.8
CAN YOU DO YARD WORK LIKE RAKING LEAVES, WEEDING OR PUSHING A MOWER: NO
CAN YOU HAVE SEXUAL RELATIONS: NO

## 2025-01-30 NOTE — ANESTHESIA PREPROCEDURE EVALUATION
Patient: Kamilah Manzo    Procedure Information       Date/Time: 02/13/25 0800    Scheduled providers: Say Nathan MD    Procedure: COLONOSCOPY    Location: [facility]            Relevant Problems   Anesthesia (within normal limits)      Cardiac   (+) Chest pain   (+) Hypercholesterolemia   (+) Hypertension      Pulmonary   (+) AB (asthmatic bronchitis) ([facility])   (+) Asthma with status asthmaticus, unspecified asthma severity, unspecified whether persistent ([facility])   (+) Asthmatic bronchitis ([facility])   (+) Pneumonia      Neuro   (+) Anxiety   (+) Migraine headache   (+) Seizure (Multi)      GI   (+) Chronic diarrhea   (+) GERD (gastroesophageal reflux disease)      /Renal   (+) Hydronephrosis      Liver (within normal limits)      Endocrine   (+) Controlled diabetes mellitus (Multi)   (+) Obese   (+) Type 2 diabetes mellitus with hyperglycemia, without long-term current use of insulin      Hematology (within normal limits)      Musculoskeletal (within normal limits)      ID   (+) Pneumonia      Skin   (+) Dermatitis, eczematoid      GYN (within normal limits)     There were no vitals filed for this visit.    Past Surgical History:   Procedure Laterality Date    BREAST BIOPSY Right     COLONOSCOPY  08/14/2015    Complete Colonoscopy    DILATION AND CURETTAGE OF UTERUS  10/02/2013    Dilation And Curettage Of Cervical Stump    TONSILLECTOMY  02/28/2014    Tonsillectomy    TOTAL ABDOMINAL HYSTERECTOMY W/ BILATERAL SALPINGOOPHORECTOMY  02/28/2014    Total Abdominal Hysterectomy With Removal Of Both Ovaries     Past Medical History:   Diagnosis Date    Benign neoplasm of colon, unspecified 02/28/2014    Tubular adenoma of colon    Diverticulosis of large intestine without perforation or abscess without bleeding 12/18/2013    Diverticulosis of colon    Encounter for screening for malignant neoplasm of colon 10/02/2013    Encounter for screening for malignant neoplasm of colon    Gastro-esophageal  reflux disease without esophagitis 12/02/2014    GERD (gastroesophageal reflux disease)    Migraine, unspecified, not intractable, without status migrainosus 01/14/2019    Migraine headache    Other conditions influencing health status 04/19/2022    Diabetes mellitus type II, uncontrolled    Pure hypercholesterolemia, unspecified 12/12/2022    Hypercholesterolemia    Unspecified asthma, uncomplicated ([facility]) 06/08/2022    AB (asthmatic bronchitis)    Unspecified convulsions (Multi) 02/28/2014    Seizures    Vitamin D deficiency, unspecified 12/12/2022    Vitamin D deficiency       Current Outpatient Medications:     albuterol 2.5 mg /3 mL (0.083 %) nebulizer solution, Take 3 mL (2.5 mg) by nebulization every 6 hours if needed for wheezing., Disp: 75 mL, Rfl: 1    albuterol 90 mcg/actuation inhaler, Inhale 2 puffs every 4 hours if needed for wheezing., Disp: 18 g, Rfl: 1    atorvastatin (Lipitor) 40 mg tablet, Take 1 tablet (40 mg) by mouth once daily., Disp: 90 tablet, Rfl: 0    blood sugar diagnostic (True Metrix Glucose Test Strip) strip, TEST BLOOD SUGAR DAILY, Disp: 100 strip, Rfl: 3    budesonide-formoteroL (Symbicort) 160-4.5 mcg/actuation inhaler, Inhale 2 puffs 2 times a day., Disp: 10.2 g, Rfl: 2    cholecalciferol (Vitamin D-3) 50 MCG (2000 UT) tablet, Take 1 tablet (2,000 Units) by mouth once daily., Disp: , Rfl:     guaiFENesin (Mucinex) 1,200 mg tablet extended release 12hr, Take by mouth every 12 hours if needed., Disp: , Rfl:     losartan (Cozaar) 25 mg tablet, Take 1 tablet (25 mg) by mouth once daily., Disp: 90 tablet, Rfl: 0    metFORMIN (Glucophage) 500 mg tablet, TAKE 2 TABLETS  (500 MG) BY MOUTH ONCE DAILY IN THE EVENING. TAKE BEFORE MEALS., Disp: 180 tablet, Rfl: 0    nebulizers misc, USE AS DIRECTED., Disp: , Rfl:     sod sulf-pot chloride-mag sulf (Sutab) 1.479-0.188- 0.225 gram tablet, Take one bottle of 12 tablets by mouth twice as directed by the prep instructions, Disp: 24 tablet, Rfl:  0    SUMAtriptan (Imitrex) 5 mg/actuation nasal spray, Administer into affected nostril(s)., Disp: , Rfl:   Prior to Admission medications    Medication Sig Start Date End Date Taking? Authorizing Provider   albuterol 2.5 mg /3 mL (0.083 %) nebulizer solution Take 3 mL (2.5 mg) by nebulization every 6 hours if needed for wheezing. 12/5/24 3/5/25  Betty Flores MD   albuterol 90 mcg/actuation inhaler Inhale 2 puffs every 4 hours if needed for wheezing. 11/5/24   Betty Flores MD   atorvastatin (Lipitor) 40 mg tablet Take 1 tablet (40 mg) by mouth once daily. 1/23/25   Betty Flores MD   blood sugar diagnostic (True Metrix Glucose Test Strip) strip TEST BLOOD SUGAR DAILY 10/2/23   Betty Flores MD   budesonide-formoteroL (Symbicort) 160-4.5 mcg/actuation inhaler Inhale 2 puffs 2 times a day. 12/5/24   Betty Flores MD   cholecalciferol (Vitamin D-3) 50 MCG (2000 UT) tablet Take 1 tablet (2,000 Units) by mouth once daily. 2/28/14   Historical Provider, MD   guaiFENesin (Mucinex) 1,200 mg tablet extended release 12hr Take by mouth every 12 hours if needed. 4/20/22   Historical Provider, MD   losartan (Cozaar) 25 mg tablet Take 1 tablet (25 mg) by mouth once daily. 1/23/25   Betty Flores MD   metFORMIN (Glucophage) 500 mg tablet TAKE 2 TABLETS  (500 MG) BY MOUTH ONCE DAILY IN THE EVENING. TAKE BEFORE MEALS. 1/23/25   Betty Flores MD   nebulizers misc USE AS DIRECTED. 4/6/15   Historical Provider, MD   sod sulf-pot chloride-mag sulf (Sutab) 1.479-0.188- 0.225 gram tablet Take one bottle of 12 tablets by mouth twice as directed by the prep instructions 5/22/24   Say Nathan MD   SUMAtriptan (Imitrex) 5 mg/actuation nasal spray Administer into affected nostril(s).    Historical Provider, MD     Allergies   Allergen Reactions    Propoxyphene N-Acetaminophen Unknown    Propoxyphene-Acetaminophen Unknown    Tetanus Vaccines And Toxoid Unknown    Doxycycline Rash     Propoxyphene Unknown and Rash    Tetanus Toxoid Rash     Social History     Tobacco Use    Smoking status: Some Days     Types: Cigarettes     Passive exposure: Current    Smokeless tobacco: Never   Substance Use Topics    Alcohol use: Never         Chemistry    Lab Results   Component Value Date/Time     11/29/2024 1030    K 4.4 11/29/2024 1030     11/29/2024 1030    CO2 30 11/29/2024 1030    BUN 12 11/29/2024 1030    CREATININE 0.61 11/29/2024 1030    Lab Results   Component Value Date/Time    CALCIUM 9.3 11/29/2024 1030    ALKPHOS 81 11/29/2024 1030    AST 16 11/29/2024 1030    ALT 34 11/29/2024 1030    BILITOT 0.6 11/29/2024 1030          Lab Results   Component Value Date/Time    WBC 7.7 11/29/2024 1030    HGB 14.6 11/29/2024 1030    HCT 46.4 (H) 11/29/2024 1030     11/29/2024 1030     Lab Results   Component Value Date/Time    PROTIME 11.0 07/21/2023 2045    INR 1.0 07/21/2023 2045     No results found for this or any previous visit (from the past 4464 hours).  No results found for this or any previous visit from the past 1095 days.    Clinical information reviewed:                 Chart reviewed.  No clearances ordered.    Per Dr Flores-  h/o chest pain, htn resolved          Under stress /           Cardio follow up with stress test neg          EKG SR 80  5-24    NPO Detail:  No data recorded     Physical Exam    Airway  Mallampati: II     Cardiovascular - normal exam     Dental    Pulmonary - normal exam     Abdominal - normal exam             Anesthesia Plan    History of general anesthesia?: yes  History of complications of general anesthesia?: no    ASA 3     MAC     The patient is a current smoker.  Patient was previously instructed to abstain from smoking on day of procedure.    intravenous induction   Anesthetic plan and risks discussed with patient.

## 2025-01-30 NOTE — PREPROCEDURE INSTRUCTIONS
Medication List            Accurate as of January 30, 2025  2:09 PM. Always use your most recent med list.                * albuterol 90 mcg/actuation inhaler  Inhale 2 puffs every 4 hours if needed for wheezing.  Medication Adjustments for Surgery: Take/Use as prescribed     * albuterol 2.5 mg /3 mL (0.083 %) nebulizer solution  Take 3 mL (2.5 mg) by nebulization every 6 hours if needed for wheezing.  Medication Adjustments for Surgery: Take/Use as prescribed     atorvastatin 40 mg tablet  Commonly known as: Lipitor  Take 1 tablet (40 mg) by mouth once daily.  Medication Adjustments for Surgery: Do Not take on the morning of surgery     budesonide-formoteroL 160-4.5 mcg/actuation inhaler  Commonly known as: Symbicort  Inhale 2 puffs 2 times a day.  Medication Adjustments for Surgery: Take/Use as prescribed     cholecalciferol 50 MCG (2000 UT) tablet  Commonly known as: Vitamin D-3  Additional Medication Adjustments for Surgery: Take last dose 7 days before surgery     co-enzyme Q-10 30 mg capsule  Additional Medication Adjustments for Surgery: Take last dose 7 days before surgery     guaiFENesin 1,200 mg tablet extended release 12hr  Commonly known as: Mucinex  Medication Adjustments for Surgery: Do Not take on the morning of surgery     losartan 25 mg tablet  Commonly known as: Cozaar  Take 1 tablet (25 mg) by mouth once daily.  Medication Adjustments for Surgery: Take/Use as prescribed     metFORMIN 500 mg tablet  Commonly known as: Glucophage  TAKE 2 TABLETS  (500 MG) BY MOUTH ONCE DAILY IN THE EVENING. TAKE BEFORE MEALS.  Medication Adjustments for Surgery: Take last dose 2 days before surgery     nebulizers misc  Medication Adjustments for Surgery: Take/Use as prescribed     SUMAtriptan 5 mg/actuation nasal spray  Commonly known as: Imitrex  Medication Adjustments for Surgery: Take/Use as prescribed     Sutab 1.479-0.188- 0.225 gram tablet tablet  Generic drug: sod sulf-pot chloride-mag sulf  Take one  bottle of 12 tablets by mouth twice as directed by the prep instructions  Medication Adjustments for Surgery: Take/Use as prescribed     True Metrix Glucose Test Strip strip  Generic drug: blood sugar diagnostic  TEST BLOOD SUGAR DAILY  Medication Adjustments for Surgery: Take/Use as prescribed           * This list has 2 medication(s) that are the same as other medications prescribed for you. Read the directions carefully, and ask your doctor or other care provider to review them with you.                          Call outpatient surgery the day before between 1-3 pm to get your arrival time.   [phone]    No food the day before other then egg and plain toast or banana, day of procedure, only clear liquids. Stay hydrated.     Follow prep instructions.     You can have clear liquids up to 3 hrs prior to your procedure.  NO DAIRY, NO CREAMER, NO NON DAIRY OR ALMOND, OAT, COCONUT ETC.    Please refrain from smoking THC, cigarettes or vaping prior to your procedure at least 24 hrs.     When  you arrive park in the back ER parking lot.  Come through the ER lobby and take the first elevator to second floor.   Check in on the outpatient surgery window.    Leave all valuables at home and remove all piercing's.      NO driving for 24 hrs if you have had anesthesia or sedation.   You will also need a  if you are receiving sedation.       Bring glasses so you can read what you are signing.

## 2025-02-03 ENCOUNTER — TELEMEDICINE (OUTPATIENT)
Dept: PRIMARY CARE | Facility: CLINIC | Age: 65
End: 2025-02-03
Payer: MEDICARE

## 2025-02-03 VITALS — OXYGEN SATURATION: 95 %

## 2025-02-03 DIAGNOSIS — I10 HYPERTENSION, UNSPECIFIED TYPE: ICD-10-CM

## 2025-02-03 DIAGNOSIS — E66.01 CLASS 3 SEVERE OBESITY DUE TO EXCESS CALORIES WITH SERIOUS COMORBIDITY AND BODY MASS INDEX (BMI) OF 40.0 TO 44.9 IN ADULT: ICD-10-CM

## 2025-02-03 DIAGNOSIS — J45.909 ACUTE ASTHMATIC BRONCHITIS (HHS-HCC): Primary | ICD-10-CM

## 2025-02-03 DIAGNOSIS — E66.813 CLASS 3 SEVERE OBESITY DUE TO EXCESS CALORIES WITH SERIOUS COMORBIDITY AND BODY MASS INDEX (BMI) OF 40.0 TO 44.9 IN ADULT: ICD-10-CM

## 2025-02-03 DIAGNOSIS — E55.9 VITAMIN D DEFICIENCY: ICD-10-CM

## 2025-02-03 DIAGNOSIS — E11.69 TYPE 2 DIABETES MELLITUS WITH OTHER SPECIFIED COMPLICATION, WITHOUT LONG-TERM CURRENT USE OF INSULIN: ICD-10-CM

## 2025-02-03 DIAGNOSIS — E78.00 HYPERCHOLESTEROLEMIA: ICD-10-CM

## 2025-02-03 PROCEDURE — 1160F RVW MEDS BY RX/DR IN RCRD: CPT | Performed by: INTERNAL MEDICINE

## 2025-02-03 PROCEDURE — 1159F MED LIST DOCD IN RCRD: CPT | Performed by: INTERNAL MEDICINE

## 2025-02-03 PROCEDURE — 99214 OFFICE O/P EST MOD 30 MIN: CPT | Performed by: INTERNAL MEDICINE

## 2025-02-03 PROCEDURE — G2211 COMPLEX E/M VISIT ADD ON: HCPCS | Performed by: INTERNAL MEDICINE

## 2025-02-03 PROCEDURE — 4010F ACE/ARB THERAPY RXD/TAKEN: CPT | Performed by: INTERNAL MEDICINE

## 2025-02-03 RX ORDER — METHYLPREDNISOLONE 4 MG/1
TABLET ORAL
Qty: 21 TABLET | Refills: 0 | Status: SHIPPED | OUTPATIENT
Start: 2025-02-03 | End: 2025-02-09

## 2025-02-03 RX ORDER — BENZONATATE 100 MG/1
100 CAPSULE ORAL 3 TIMES DAILY PRN
Qty: 30 CAPSULE | Refills: 0 | Status: SHIPPED | OUTPATIENT
Start: 2025-02-03 | End: 2025-02-13

## 2025-02-03 ASSESSMENT — ENCOUNTER SYMPTOMS
LEG PAIN: 0
HEMOPTYSIS: 0
VOMITING: 0
HEADACHES: 0
SORE THROAT: 0
ABDOMINAL PAIN: 0
RHINORRHEA: 0
SHORTNESS OF BREATH: 1
CLAUDICATION: 0
PND: 0
WHEEZING: 1
SYNCOPE: 0
SPUTUM PRODUCTION: 1
FEVER: 0
ORTHOPNEA: 1
NECK PAIN: 0
SWOLLEN GLANDS: 0

## 2025-02-03 NOTE — PROGRESS NOTES
Subjective   Patient ID: Kamilah Manzo is a 64 y.o. female who presents for Chest Pain (Chest tightness ) and Wheezing.  Chest Pain   Associated symptoms include orthopnea, shortness of breath and sputum production. Pertinent negatives include no abdominal pain, claudication, fever, headaches, hemoptysis, leg pain, PND, syncope or vomiting.   Wheezing   Associated symptoms include chest pain, shortness of breath and sputum production. Pertinent negatives include no abdominal pain, coryza, ear pain, fever, headaches, hemoptysis, neck pain, rash, rhinorrhea, sore throat, swollen glands or vomiting.   Shortness of Breath  This is a new problem. The current episode started yesterday. The problem occurs constantly. The problem has been unchanged. Associated symptoms include chest pain, orthopnea, sputum production and wheezing. Pertinent negatives include no abdominal pain, claudication, coryza, ear pain, fever, headaches, hemoptysis, leg pain, leg swelling, neck pain, PND, rash, rhinorrhea, sore throat, swollen glands, syncope or vomiting.       Tele health    Same day sick               Chest tight x yesterday             Cough dry,  congested             No fever,  dyspnea, wheezing             Acute asthmatic bronchiis             Medrol dose milan as directed             Tessalon perles as directed              Risk / benefits rev'd              Rest increase fluids              Continue inhalers               h/o chest pain, htn resolved          Under stress /           Cardio follow up with stress test neg          EKG SR 80  5-24             DM -2 uncontrolled          HBA1C 9.1  11-24          on metformin tolerating well          FBS  151 yesterday am          Jardiance caused genital redness / stopped          Rev'd and scanned acceptable              hypertension better on rx no side effects                   Paraesophageal hernia          Surgical consult / not done yet     asthma on rx no side  effects     Smoking cessation discussed  quit     hypercholesterolemia on rx no side effects      vit D stable on rx no side effects     H/o seizures in 20's  No cause found and samantha since     diet / exercise rev'd     eyes and feet on follow up     check CT urogram not done (too $$$)  urology consult not done     follow up Dr Jac rose colonoscopy for h/o colon polyps not done yet     colonoscopy  planned 2-25    Review of Systems   Constitutional:  Negative for fever.   HENT:  Negative for ear pain, rhinorrhea and sore throat.    Respiratory:  Positive for sputum production, shortness of breath and wheezing. Negative for hemoptysis.    Cardiovascular:  Positive for chest pain and orthopnea. Negative for claudication, leg swelling, syncope and PND.   Gastrointestinal:  Negative for abdominal pain and vomiting.   Musculoskeletal:  Negative for neck pain.   Skin:  Negative for rash.   Neurological:  Negative for headaches.   All other systems reviewed and are negative.      Objective   SpO2 95%   Lab Results   Component Value Date    WBC 7.7 11/29/2024    HGB 14.6 11/29/2024    HCT 46.4 (H) 11/29/2024     11/29/2024    CHOL 153 11/29/2024    TRIG 213 (H) 11/29/2024    HDL 40.3 11/29/2024    ALT 34 11/29/2024    AST 16 11/29/2024     11/29/2024    K 4.4 11/29/2024     11/29/2024    CREATININE 0.61 11/29/2024    BUN 12 11/29/2024    CO2 30 11/29/2024    TSH 1.39 11/29/2024    INR 1.0 07/21/2023    HGBA1C 9.1 (H) 11/29/2024           Physical Exam  Constitutional:       Appearance: Normal appearance. She is obese.   Pulmonary:      Effort: Pulmonary effort is normal.   Neurological:      Mental Status: She is alert.   Psychiatric:         Mood and Affect: Mood normal.         Problem List Items Addressed This Visit             ICD-10-CM    Hypercholesterolemia E78.00    Hypertension I10    Obese E66.9    Vitamin D deficiency E55.9     Other Visit Diagnoses         Codes    Acute asthmatic bronchitis  ([facility])    -  Primary J45.909    Relevant Medications    methylPREDNISolone (Medrol Dospak) 4 mg tablets    benzonatate (Tessalon) 100 mg capsule    Type 2 diabetes mellitus with other specified complication, without long-term current use of insulin     E11.69          Assessment/Plan     Tele health    Same day sick               Chest tight x yesterday             Cough dry,  congested             No fever,  dyspnea, wheezing             Acute asthmatic bronchiis             Medrol dose milan as directed             Tessalon perles as directed              Risk / benefits rev'd              Rest increase fluids              Continue inhalers               h/o chest pain, htn resolved          Under stress /           Cardio follow up with stress test neg          EKG SR 80  5-24             DM -2 uncontrolled          HBA1C 9.1  11-24          on metformin tolerating well          FBS  151 yesterday am          Jardiance caused genital redness / stopped          Rev'd and scanned acceptable              hypertension better on rx no side effects                   Paraesophageal hernia          Surgical consult / not done yet     asthma on rx no side effects     Smoking cessation discussed  quit     hypercholesterolemia on rx no side effects      vit D stable on rx no side effects     H/o seizures in 20's  No cause found and samantha since     diet / exercise rev'd     eyes and feet on follow up     check CT urogram not done (too $$$)  urology consult not done     follow up Dr Nathan re colonoscopy for h/o colon polyps not done yet     colonoscopy  planned 2-25      mammogram 9-24  dexa 9-24 penia  colonoscopy 2013 polyps  Dr Nathan  GYN n/a  CT lung cancer screening n/a  immunizations rev'd UTD  BMI 42.5    Follow up as scheduled

## 2025-02-06 ENCOUNTER — TELEPHONE (OUTPATIENT)
Dept: PRIMARY CARE | Facility: CLINIC | Age: 65
End: 2025-02-06
Payer: MEDICARE

## 2025-02-06 DIAGNOSIS — J45.909 ACUTE ASTHMATIC BRONCHITIS (HHS-HCC): Primary | ICD-10-CM

## 2025-02-06 RX ORDER — AMOXICILLIN AND CLAVULANATE POTASSIUM 875; 125 MG/1; MG/1
875 TABLET, FILM COATED ORAL 2 TIMES DAILY
Qty: 20 TABLET | Refills: 0 | Status: SHIPPED | OUTPATIENT
Start: 2025-02-06 | End: 2025-02-16

## 2025-02-06 NOTE — TELEPHONE ENCOUNTER
Patient called stating that you had wanted her to call if her mucus turned a color.  It is now green but she is getting it out.  She stated that you told her on her telemedicine visit that you would send an antibiotic to her pharmacy is this happened.

## 2025-02-13 ENCOUNTER — ANESTHESIA (OUTPATIENT)
Dept: GASTROENTEROLOGY | Facility: HOSPITAL | Age: 65
End: 2025-02-13
Payer: MEDICARE

## 2025-02-13 ENCOUNTER — HOSPITAL ENCOUNTER (OUTPATIENT)
Dept: GASTROENTEROLOGY | Facility: HOSPITAL | Age: 65
Discharge: HOME | End: 2025-02-13
Payer: MEDICARE

## 2025-02-13 VITALS
RESPIRATION RATE: 18 BRPM | TEMPERATURE: 98.4 F | HEIGHT: 66 IN | OXYGEN SATURATION: 98 % | DIASTOLIC BLOOD PRESSURE: 76 MMHG | WEIGHT: 233.69 LBS | SYSTOLIC BLOOD PRESSURE: 126 MMHG | BODY MASS INDEX: 37.56 KG/M2 | HEART RATE: 92 BPM

## 2025-02-13 DIAGNOSIS — Z12.11 ENCOUNTER FOR SCREENING FOR MALIGNANT NEOPLASM OF COLON: ICD-10-CM

## 2025-02-13 LAB — GLUCOSE BLD MANUAL STRIP-MCNC: 266 MG/DL (ref 74–99)

## 2025-02-13 PROCEDURE — 2500000004 HC RX 250 GENERAL PHARMACY W/ HCPCS (ALT 636 FOR OP/ED): Performed by: NURSE ANESTHETIST, CERTIFIED REGISTERED

## 2025-02-13 PROCEDURE — 45385 COLONOSCOPY W/LESION REMOVAL: CPT | Performed by: SURGERY

## 2025-02-13 PROCEDURE — 7100000009 HC PHASE TWO TIME - INITIAL BASE CHARGE

## 2025-02-13 PROCEDURE — 3700000002 HC GENERAL ANESTHESIA TIME - EACH INCREMENTAL 1 MINUTE

## 2025-02-13 PROCEDURE — 82947 ASSAY GLUCOSE BLOOD QUANT: CPT

## 2025-02-13 PROCEDURE — 7100000010 HC PHASE TWO TIME - EACH INCREMENTAL 1 MINUTE

## 2025-02-13 PROCEDURE — 3700000001 HC GENERAL ANESTHESIA TIME - INITIAL BASE CHARGE

## 2025-02-13 RX ORDER — LIDOCAINE HYDROCHLORIDE 20 MG/ML
INJECTION, SOLUTION INFILTRATION; PERINEURAL AS NEEDED
Status: DISCONTINUED | OUTPATIENT
Start: 2025-02-13 | End: 2025-02-13

## 2025-02-13 RX ORDER — PROPOFOL 10 MG/ML
INJECTION, EMULSION INTRAVENOUS AS NEEDED
Status: DISCONTINUED | OUTPATIENT
Start: 2025-02-13 | End: 2025-02-13

## 2025-02-13 RX ADMIN — PROPOFOL 50 MG: 10 INJECTION, EMULSION INTRAVENOUS at 08:04

## 2025-02-13 RX ADMIN — LIDOCAINE HYDROCHLORIDE 40 MG: 20 INJECTION, SOLUTION INFILTRATION; PERINEURAL at 07:56

## 2025-02-13 RX ADMIN — PROPOFOL 50 MG: 10 INJECTION, EMULSION INTRAVENOUS at 08:00

## 2025-02-13 RX ADMIN — PROPOFOL 50 MG: 10 INJECTION, EMULSION INTRAVENOUS at 08:12

## 2025-02-13 RX ADMIN — PROPOFOL 100 MG: 10 INJECTION, EMULSION INTRAVENOUS at 07:56

## 2025-02-13 RX ADMIN — PROPOFOL 50 MG: 10 INJECTION, EMULSION INTRAVENOUS at 08:16

## 2025-02-13 RX ADMIN — PROPOFOL 50 MG: 10 INJECTION, EMULSION INTRAVENOUS at 08:08

## 2025-02-13 SDOH — HEALTH STABILITY: MENTAL HEALTH: CURRENT SMOKER: 1

## 2025-02-13 ASSESSMENT — PAIN - FUNCTIONAL ASSESSMENT
PAIN_FUNCTIONAL_ASSESSMENT: 0-10

## 2025-02-13 ASSESSMENT — PAIN SCALES - GENERAL
PAINLEVEL_OUTOF10: 0 - NO PAIN
PAIN_LEVEL: 0

## 2025-02-13 ASSESSMENT — COLUMBIA-SUICIDE SEVERITY RATING SCALE - C-SSRS
2. HAVE YOU ACTUALLY HAD ANY THOUGHTS OF KILLING YOURSELF?: NO
6. HAVE YOU EVER DONE ANYTHING, STARTED TO DO ANYTHING, OR PREPARED TO DO ANYTHING TO END YOUR LIFE?: NO
1. IN THE PAST MONTH, HAVE YOU WISHED YOU WERE DEAD OR WISHED YOU COULD GO TO SLEEP AND NOT WAKE UP?: NO

## 2025-02-13 NOTE — DISCHARGE INSTRUCTIONS
Patient Instructions after a Colonoscopy      The anesthetics, sedatives or narcotics which were given to you today will be acting in your body for the next 24 hours, so you might feel a little sleepy or groggy.  This feeling should slowly wear off. Carefully read and follow the instructions.     You received sedation today:  - Do not drive or operate any machinery or power tools of any kind.   - No alcoholic beverages today, not even beer or wine.  - Do not make any important decisions or sign any legal documents.  - No over the counter medications that contain alcohol or that may cause drowsiness.  - Do not make any important decisions or sign any legal documents.  - Make sure you have someone with you for first 24 hours.    While it is common to experience mild to moderate abdominal distention, gas, or belching after your procedure, if any of these symptoms occur following discharge from the GI Lab or within one week of having your procedure, call the [facility] to be advised whether a visit to your nearest Urgent Care or Emergency Department is indicated.  Take this paper with you if you go.     - If you develop an allergic reaction to the medications that were given during your procedure such as difficulty breathing, rash, hives, severe nausea, vomiting or lightheadedness.  - If you experience chest pain, shortness of breath, severe abdominal pain, fevers and chills.  -If you develop signs and symptoms of bleeding such as blood in your spit, if your stools turn black, tarry, or bloody  - If you have not urinated within 8 hours following your procedure.  - If your IV site becomes painful, red, inflamed, or looks infected.    If you received a biopsy/polypectomy/sphincterotomy the following instructions apply below:    __ Do not use Aspirin containing products, non-steroidal medications or anti-coagulants for one week following your procedure. (Examples of these types of medications are: Advil,  Arthrotec, Aleve, Coumadin, Ecotrin, Heparin, Ibuprofen, Indocin, Motrin, Naprosyn, Nuprin, Plavix, Vioxx, and Voltarin, or their generic forms.  This list is not all-inclusive.  Check with your physician or pharmacist before resuming medications.)   __ Eat a soft diet today.  Avoid foods that are poorly digested for the next 24 hours.  These foods would include: nuts, beans, lettuce, red meats, and fried foods. Start with liquids and advance your diet as tolerated, gradually work up to eating solids.   __ Do not have a Barium Study or Enema for one week.    Your physician recommends the additional following instructions:    -You have a contact number available for emergencies. The signs and symptoms of potential delayed complications were discussed with you. You may return to normal activities tomorrow.  -Resume your previous diet.  -Continue your present medications.   -We are waiting for your pathology results.  -Your physician has recommended a repeat colonoscopy (date to be determined after pending pathology results are reviewed) for surveillance based on pathology results.  -The findings and recommendations have been discussed with you.  -The findings and recommendations were discussed with your family.  - Please see Medication Reconciliation Form for new medication/medications prescribed.       If you experience any problems or have any questions following discharge from the GI Lab, please call:        Nurse Signature                                                                        Date___________________                                                                            Patient/Responsible Party Signature                                        Date___________________

## 2025-02-13 NOTE — H&P
History Of Present Illness  Kamilah Manzo is a 64 y.o. female presenting for a colonoscopy      Past Medical History  Past Medical History:   Diagnosis Date    Benign neoplasm of colon, unspecified 02/28/2014    Tubular adenoma of colon    Diverticulosis of large intestine without perforation or abscess without bleeding 12/18/2013    Diverticulosis of colon    Encounter for screening for malignant neoplasm of colon 10/02/2013    Encounter for screening for malignant neoplasm of colon    Gastro-esophageal reflux disease without esophagitis 12/02/2014    GERD (gastroesophageal reflux disease)    Migraine, unspecified, not intractable, without status migrainosus 01/14/2019    Migraine headache    Other conditions influencing health status 04/19/2022    Diabetes mellitus type II, uncontrolled    Pure hypercholesterolemia, unspecified 12/12/2022    Hypercholesterolemia    Seizure disorder (Multi)     had seizures in her 20's    Type 2 diabetes mellitus     Unspecified asthma, uncomplicated ([facility]) 06/08/2022    AB (asthmatic bronchitis)    Unspecified convulsions (Multi) 02/28/2014    Seizures    Vitamin D deficiency, unspecified 12/12/2022    Vitamin D deficiency       Surgical History  Past Surgical History:   Procedure Laterality Date    BREAST BIOPSY Right     COLONOSCOPY  08/14/2015    Complete Colonoscopy    DILATION AND CURETTAGE OF UTERUS  10/02/2013    Dilation And Curettage Of Cervical Stump    TONSILLECTOMY  02/28/2014    Tonsillectomy    TOTAL ABDOMINAL HYSTERECTOMY W/ BILATERAL SALPINGOOPHORECTOMY  02/28/2014    Total Abdominal Hysterectomy With Removal Of Both Ovaries        Social History  She reports that she has been smoking cigarettes. She has been exposed to tobacco smoke. She has never used smokeless tobacco. She reports that she does not drink alcohol and does not use drugs.    Family History  No family history on file.     Allergies  Propoxyphene-acetaminophen, Tetanus vaccines and toxoid,  "Doxycycline, and Propoxyphene    Review of Systems   All other systems reviewed and are negative.       Physical Exam  Constitutional:       Appearance: Normal appearance.   Cardiovascular:      Heart sounds: Normal heart sounds.   Pulmonary:      Breath sounds: Normal breath sounds and air entry.   Abdominal:      General: Abdomen is flat.      Palpations: Abdomen is soft.      Tenderness: There is no abdominal tenderness.   Neurological:      Mental Status: She is alert.          Last Recorded Vitals  Blood pressure (!) 144/94, pulse 94, temperature 35.9 °C (96.6 °F), resp. rate 17, height 1.676 m (5' 6\"), weight 106 kg (233 lb 11 oz), SpO2 99%.         Assessment/Plan   Assessment & Plan  Encounter for screening for malignant neoplasm of colon      COLONOSCOPY/BIOPSIES.  Risks include, but not limited to pain, infection, bleeding, perforation, missed lesions, aspiration, risk of cardiac, pulmonary, neurologic, locomotor, anesthetic events, incomplete colonoscopy, and other unforeseen complications including death    Say Nathan MD    "

## 2025-02-13 NOTE — ANESTHESIA POSTPROCEDURE EVALUATION
Patient: Kamilah Manzo    Procedure Summary       Date: 02/13/25 Room / Location: [facility]    Anesthesia Start: 0754 Anesthesia Stop: 0823    Procedure: COLONOSCOPY Diagnosis: Encounter for screening for malignant neoplasm of colon    Scheduled Providers: Say Nathan MD Responsible Provider: VASQUEZ Valdivia    Anesthesia Type: MAC ASA Status: 3            Anesthesia Type: MAC    Vitals Value Taken Time   /56 02/13/25 0822   Temp 36.9 °C (98.4 °F) 02/13/25 0822   Pulse 90 02/13/25 0822   Resp 17 02/13/25 0822   SpO2 94 % 02/13/25 0822       Anesthesia Post Evaluation    Patient location during evaluation: PACU  Patient participation: complete - patient participated  Level of consciousness: awake and alert  Pain score: 0  Pain management: adequate  Airway patency: patent  Cardiovascular status: acceptable and stable  Respiratory status: acceptable and room air  Hydration status: acceptable  Postoperative Nausea and Vomiting: none        There were no known notable events for this encounter.

## 2025-02-14 ASSESSMENT — PAIN SCALES - GENERAL: PAINLEVEL_OUTOF10: 0 - NO PAIN

## 2025-02-19 ENCOUNTER — TELEPHONE (OUTPATIENT)
Dept: SURGERY | Facility: CLINIC | Age: 65
End: 2025-02-19
Payer: MEDICARE

## 2025-02-19 LAB
LABORATORY COMMENT REPORT: NORMAL
PATH REPORT.FINAL DX SPEC: NORMAL
PATH REPORT.GROSS SPEC: NORMAL
PATH REPORT.RELEVANT HX SPEC: NORMAL
PATH REPORT.TOTAL CANCER: NORMAL

## 2025-02-19 NOTE — TELEPHONE ENCOUNTER
----- Message from Say Nathan sent at 2/19/2025 12:21 PM EST -----  Let her know that she has a polyp known as a sessile serrated adenoma.  This is essentially a benign polyp and is completely removed.  The guidelines however state that a 5-year follow-up is recommended.  Let her know that I will change her follow-up from 10 years to 5 years.  She will receive a reminder

## 2025-02-23 DIAGNOSIS — J45.901 MODERATE ASTHMA WITH ACUTE EXACERBATION, UNSPECIFIED WHETHER PERSISTENT (HHS-HCC): ICD-10-CM

## 2025-02-24 RX ORDER — BUDESONIDE AND FORMOTEROL FUMARATE DIHYDRATE 160; 4.5 UG/1; UG/1
2 AEROSOL RESPIRATORY (INHALATION) 2 TIMES DAILY
Qty: 10.2 G | Refills: 2 | Status: SHIPPED | OUTPATIENT
Start: 2025-02-24

## 2025-03-07 DIAGNOSIS — J45.909 ACUTE ASTHMATIC BRONCHITIS (HHS-HCC): ICD-10-CM

## 2025-03-09 RX ORDER — ALBUTEROL SULFATE 90 UG/1
2 INHALANT RESPIRATORY (INHALATION) EVERY 4 HOURS PRN
Qty: 18 G | Refills: 1 | Status: SHIPPED | OUTPATIENT
Start: 2025-03-09

## 2025-04-21 DIAGNOSIS — E11.69 TYPE 2 DIABETES MELLITUS WITH OTHER SPECIFIED COMPLICATION, WITHOUT LONG-TERM CURRENT USE OF INSULIN: ICD-10-CM

## 2025-04-21 DIAGNOSIS — I10 HYPERTENSION, UNSPECIFIED TYPE: ICD-10-CM

## 2025-04-21 RX ORDER — METFORMIN HYDROCHLORIDE 500 MG/1
TABLET ORAL
Qty: 60 TABLET | Refills: 0 | Status: SHIPPED | OUTPATIENT
Start: 2025-04-21

## 2025-04-21 RX ORDER — LOSARTAN POTASSIUM 25 MG/1
25 TABLET ORAL DAILY
Qty: 30 TABLET | Refills: 0 | Status: SHIPPED | OUTPATIENT
Start: 2025-04-21

## 2025-05-06 DIAGNOSIS — J45.909 ACUTE ASTHMATIC BRONCHITIS (HHS-HCC): ICD-10-CM

## 2025-05-08 ENCOUNTER — APPOINTMENT (OUTPATIENT)
Dept: PRIMARY CARE | Facility: CLINIC | Age: 65
End: 2025-05-08
Payer: MEDICARE

## 2025-05-08 VITALS
HEART RATE: 88 BPM | HEIGHT: 66 IN | OXYGEN SATURATION: 95 % | BODY MASS INDEX: 39.34 KG/M2 | DIASTOLIC BLOOD PRESSURE: 78 MMHG | TEMPERATURE: 97.2 F | SYSTOLIC BLOOD PRESSURE: 120 MMHG | WEIGHT: 244.8 LBS

## 2025-05-08 DIAGNOSIS — Z00.00 ENCOUNTER FOR SUBSEQUENT ANNUAL WELLNESS VISIT (AWV) IN MEDICARE PATIENT: ICD-10-CM

## 2025-05-08 DIAGNOSIS — E55.9 VITAMIN D DEFICIENCY: ICD-10-CM

## 2025-05-08 DIAGNOSIS — E78.00 HYPERCHOLESTEROLEMIA: ICD-10-CM

## 2025-05-08 DIAGNOSIS — J45.909 MODERATE ASTHMA WITHOUT COMPLICATION, UNSPECIFIED WHETHER PERSISTENT (HHS-HCC): ICD-10-CM

## 2025-05-08 DIAGNOSIS — I10 HYPERTENSION, UNSPECIFIED TYPE: ICD-10-CM

## 2025-05-08 DIAGNOSIS — E11.69 TYPE 2 DIABETES MELLITUS WITH OTHER SPECIFIED COMPLICATION, WITHOUT LONG-TERM CURRENT USE OF INSULIN: ICD-10-CM

## 2025-05-08 DIAGNOSIS — E66.812 CLASS 2 SEVERE OBESITY DUE TO EXCESS CALORIES WITH SERIOUS COMORBIDITY AND BODY MASS INDEX (BMI) OF 39.0 TO 39.9 IN ADULT: ICD-10-CM

## 2025-05-08 DIAGNOSIS — Z00.00 ROUTINE GENERAL MEDICAL EXAMINATION AT HEALTH CARE FACILITY: Primary | ICD-10-CM

## 2025-05-08 DIAGNOSIS — E66.01 CLASS 2 SEVERE OBESITY DUE TO EXCESS CALORIES WITH SERIOUS COMORBIDITY AND BODY MASS INDEX (BMI) OF 39.0 TO 39.9 IN ADULT: ICD-10-CM

## 2025-05-08 PROCEDURE — 4010F ACE/ARB THERAPY RXD/TAKEN: CPT | Performed by: INTERNAL MEDICINE

## 2025-05-08 PROCEDURE — 3078F DIAST BP <80 MM HG: CPT | Performed by: INTERNAL MEDICINE

## 2025-05-08 PROCEDURE — G2211 COMPLEX E/M VISIT ADD ON: HCPCS | Performed by: INTERNAL MEDICINE

## 2025-05-08 PROCEDURE — 99214 OFFICE O/P EST MOD 30 MIN: CPT | Performed by: INTERNAL MEDICINE

## 2025-05-08 PROCEDURE — 1159F MED LIST DOCD IN RCRD: CPT | Performed by: INTERNAL MEDICINE

## 2025-05-08 PROCEDURE — 1170F FXNL STATUS ASSESSED: CPT | Performed by: INTERNAL MEDICINE

## 2025-05-08 PROCEDURE — 3074F SYST BP LT 130 MM HG: CPT | Performed by: INTERNAL MEDICINE

## 2025-05-08 PROCEDURE — G0439 PPPS, SUBSEQ VISIT: HCPCS | Performed by: INTERNAL MEDICINE

## 2025-05-08 PROCEDURE — 1160F RVW MEDS BY RX/DR IN RCRD: CPT | Performed by: INTERNAL MEDICINE

## 2025-05-08 PROCEDURE — 3008F BODY MASS INDEX DOCD: CPT | Performed by: INTERNAL MEDICINE

## 2025-05-08 PROCEDURE — 99397 PER PM REEVAL EST PAT 65+ YR: CPT | Performed by: INTERNAL MEDICINE

## 2025-05-08 RX ORDER — ALBUTEROL SULFATE 90 UG/1
2 INHALANT RESPIRATORY (INHALATION) EVERY 4 HOURS PRN
Qty: 18 G | Refills: 1 | OUTPATIENT
Start: 2025-05-08

## 2025-05-08 RX ORDER — METFORMIN HYDROCHLORIDE 500 MG/1
TABLET ORAL
Qty: 180 TABLET | Refills: 0 | Status: SHIPPED | OUTPATIENT
Start: 2025-05-08

## 2025-05-08 RX ORDER — ALBUTEROL SULFATE 0.83 MG/ML
2.5 SOLUTION RESPIRATORY (INHALATION) EVERY 6 HOURS PRN
Qty: 75 ML | Refills: 2 | Status: SHIPPED | OUTPATIENT
Start: 2025-05-08 | End: 2025-08-06

## 2025-05-08 RX ORDER — ALBUTEROL SULFATE 90 UG/1
2 INHALANT RESPIRATORY (INHALATION) EVERY 4 HOURS PRN
Qty: 18 G | Refills: 2 | Status: SHIPPED | OUTPATIENT
Start: 2025-05-08

## 2025-05-08 ASSESSMENT — ACTIVITIES OF DAILY LIVING (ADL)
DOING_HOUSEWORK: INDEPENDENT
DRESSING: INDEPENDENT
TAKING_MEDICATION: INDEPENDENT
BATHING: INDEPENDENT
MANAGING_FINANCES: INDEPENDENT
GROCERY_SHOPPING: INDEPENDENT

## 2025-05-08 ASSESSMENT — ENCOUNTER SYMPTOMS
DEPRESSION: 0
OCCASIONAL FEELINGS OF UNSTEADINESS: 0
LOSS OF SENSATION IN FEET: 0

## 2025-05-08 NOTE — PROGRESS NOTES
"Subjective   Reason for Visit: Kamilah Manzo is an 65 y.o. female here for a Medicare Wellness visit, yearly physical and follow up    Past Medical, Surgical, and Family History reviewed and updated in chart.    Reviewed all medications by prescribing practitioner or clinical pharmacist (such as prescriptions, OTCs, herbal therapies and supplements) and documented in the medical record.    HPI    Medicare wellness    Yearly physical    mammogram 9-24  dexa 9-24 penia  colonoscopy 2-25  recheck 2035  GYN n/a  CT lung cancer screening n/a  immunizations rev'd UTD  BMI 39.5    Follow up    Feels well               h/o chest pain, htn resolved          Under stress /           Cardio follow up with stress test neg          EKG SR 80  5-24             DM -2 uncontrolled          HBA1C 9.1  11-24          on metformin tolerating well         Jardiance caused genital redness / stopped          Start Ozempic 0.25 weekly            Risk / benefits rev'd              hypertension better on rx no side effects                   Paraesophageal hernia          Surgical consult / not done yet     asthma on rx no side effects     Smoking cessation discussed  quit     hypercholesterolemia on rx no side effects      vit D stable on rx no side effects     H/o seizures in 20's  No cause found and samantha since     diet / exercise rev'd     eyes and feet on follow up     check CT urogram not done (too $$$)  urology consult not done    Patient Care Team:  Betty Flores MD as PCP - General (Internal Medicine)  Betty Flores MD as PCP - [employer] PCP     Review of Systems   All other systems reviewed and are negative.      Objective   Vitals:  /78   Pulse 88   Temp 36.2 °C (97.2 °F)   Ht 1.676 m (5' 6\")   Wt 111 kg (244 lb 12.8 oz)   SpO2 95%   BMI 39.51 kg/m²       Physical Exam  Vitals reviewed.   Constitutional:       Appearance: Normal appearance. She is obese.   HENT:      Head: " Normocephalic and atraumatic.      Mouth/Throat:      Pharynx: No posterior oropharyngeal erythema.   Eyes:      General: No scleral icterus.     Conjunctiva/sclera: Conjunctivae normal.      Pupils: Pupils are equal, round, and reactive to light.   Cardiovascular:      Rate and Rhythm: Normal rate and regular rhythm.      Heart sounds: Normal heart sounds.   Pulmonary:      Effort: No respiratory distress.      Breath sounds: No wheezing.   Abdominal:      General: Abdomen is flat. Bowel sounds are normal. There is no distension.      Palpations: Abdomen is soft. There is no mass.      Tenderness: There is no abdominal tenderness. There is no rebound.   Musculoskeletal:         General: Normal range of motion.      Cervical back: Normal range of motion and neck supple.   Skin:     General: Skin is warm and dry.   Neurological:      General: No focal deficit present.      Mental Status: She is alert and oriented to person, place, and time. Mental status is at baseline.   Psychiatric:         Mood and Affect: Mood normal.         Behavior: Behavior normal.         Thought Content: Thought content normal.         Judgment: Judgment normal.         Assessment & Plan  Routine general medical examination at health care facility         Encounter for subsequent annual wellness visit (AWV) in Medicare patient         Type 2 diabetes mellitus with other specified complication, without long-term current use of insulin    Orders:    metFORMIN (Glucophage) 500 mg tablet; TAKE 2 TABLETS  (500 MG) BY MOUTH ONCE DAILY IN THE EVENING. TAKE BEFORE MEALS.    semaglutide 0.25 mg or 0.5 mg (2 mg/3 mL) pen injector; Inject 0.25 mg under the skin 1 (one) time per week.    Hypercholesterolemia         Hypertension, unspecified type         Moderate asthma without complication, unspecified whether persistent ([facility])    Orders:    albuterol 2.5 mg /3 mL (0.083 %) nebulizer solution; Take 3 mL (2.5 mg) by nebulization every 6 hours if  needed for wheezing.    albuterol 90 mcg/actuation inhaler; Inhale 2 puffs every 4 hours if needed for wheezing.    Vitamin D deficiency         Class 2 severe obesity due to excess calories with serious comorbidity and body mass index (BMI) of 39.0 to 39.9 in adult                     Medicare wellness    Yearly physical    mammogram 9-24  dexa 9-24 penia  colonoscopy 2-25  recheck 2035  GYN n/a  CT lung cancer screening n/a  immunizations rev'd UTD  BMI 39.5    Follow up    Feels well               h/o chest pain, htn resolved          Under stress /           Cardio follow up with stress test neg          EKG SR 80  5-24             DM -2 uncontrolled          HBA1C 9.1  11-24          on metformin tolerating well         Jardiance caused genital redness / stopped          Start Ozempic 0.25 weekly            Risk / benefits rev'd              hypertension better on rx no side effects                   Paraesophageal hernia          Surgical consult / not done yet     asthma on rx no side effects     Smoking cessation discussed  quit     hypercholesterolemia on rx no side effects      vit D stable on rx no side effects     H/o seizures in 20's  No cause found and samantha since     diet / exercise rev'd     eyes and feet on follow up     check CT urogram not done (too $$$)  urology consult not done    Follow up 1 month

## 2025-05-15 ENCOUNTER — TELEMEDICINE (OUTPATIENT)
Dept: PRIMARY CARE | Facility: CLINIC | Age: 65
End: 2025-05-15
Payer: MEDICARE

## 2025-05-15 DIAGNOSIS — E66.812 CLASS 2 SEVERE OBESITY DUE TO EXCESS CALORIES WITH SERIOUS COMORBIDITY AND BODY MASS INDEX (BMI) OF 39.0 TO 39.9 IN ADULT: ICD-10-CM

## 2025-05-15 DIAGNOSIS — E66.01 CLASS 2 SEVERE OBESITY DUE TO EXCESS CALORIES WITH SERIOUS COMORBIDITY AND BODY MASS INDEX (BMI) OF 39.0 TO 39.9 IN ADULT: ICD-10-CM

## 2025-05-15 DIAGNOSIS — J01.90 ACUTE NON-RECURRENT SINUSITIS, UNSPECIFIED LOCATION: ICD-10-CM

## 2025-05-15 DIAGNOSIS — J45.909 ACUTE ASTHMATIC BRONCHITIS (HHS-HCC): Primary | ICD-10-CM

## 2025-05-15 DIAGNOSIS — I10 HYPERTENSION, UNSPECIFIED TYPE: ICD-10-CM

## 2025-05-15 DIAGNOSIS — E11.69 TYPE 2 DIABETES MELLITUS WITH OTHER SPECIFIED COMPLICATION, WITHOUT LONG-TERM CURRENT USE OF INSULIN: ICD-10-CM

## 2025-05-15 PROCEDURE — 1159F MED LIST DOCD IN RCRD: CPT | Performed by: INTERNAL MEDICINE

## 2025-05-15 PROCEDURE — 1160F RVW MEDS BY RX/DR IN RCRD: CPT | Performed by: INTERNAL MEDICINE

## 2025-05-15 PROCEDURE — 99214 OFFICE O/P EST MOD 30 MIN: CPT | Performed by: INTERNAL MEDICINE

## 2025-05-15 PROCEDURE — G2211 COMPLEX E/M VISIT ADD ON: HCPCS | Performed by: INTERNAL MEDICINE

## 2025-05-15 PROCEDURE — 4010F ACE/ARB THERAPY RXD/TAKEN: CPT | Performed by: INTERNAL MEDICINE

## 2025-05-15 RX ORDER — METHYLPREDNISOLONE 4 MG/1
TABLET ORAL
Qty: 21 TABLET | Refills: 0 | Status: SHIPPED | OUTPATIENT
Start: 2025-05-15 | End: 2025-05-21

## 2025-05-15 RX ORDER — BENZONATATE 100 MG/1
100 CAPSULE ORAL 3 TIMES DAILY PRN
Qty: 30 CAPSULE | Refills: 0 | Status: SHIPPED | OUTPATIENT
Start: 2025-05-15 | End: 2025-05-25

## 2025-05-15 RX ORDER — AMOXICILLIN AND CLAVULANATE POTASSIUM 875; 125 MG/1; MG/1
875 TABLET, FILM COATED ORAL 2 TIMES DAILY
Qty: 20 TABLET | Refills: 0 | Status: SHIPPED | OUTPATIENT
Start: 2025-05-15 | End: 2025-05-25

## 2025-05-15 ASSESSMENT — ENCOUNTER SYMPTOMS
COUGH: 1
SHORTNESS OF BREATH: 1

## 2025-05-15 NOTE — PROGRESS NOTES
Subjective   Patient ID: Kamilah Manzo is a 65 y.o. female who presents for sinus infection  Virtual or Telephone Consent    An interactive audio and video telecommunication system which permits real time communications between the patient (at the originating site) and provider (at the distant site) was utilized to provide this telehealth service.   Verbal consent was requested and obtained from Kamilah Manzo on this date, 05/15/25 for a telehealth visit and the patient's location was confirmed at the time of the visit.   Cough  This is a new problem. The current episode started in the past 7 days. The problem has been gradually worsening. The problem occurs hourly. The cough is Productive of sputum and productive of purulent sputum. Associated symptoms include nasal congestion, postnasal drip and shortness of breath. Nothing aggravates the symptoms.        Tele health    Same day sick    Sinus drainage discolored x 1 week  Cough clear sputum, wheezing, mild dyspnea  No fever   Acute sinusitis  Acute asthmatic bronchitis  Augmentin 875 mg twice daily #20 no refill  Medrol Dosepak as directed  Tessalon Perles as directed  Risk/benefits reviewed  Rest increase hydration       h/o chest pain, htn resolved          Under stress /           Cardio follow up with stress test neg          EKG SR 80  5-24             DM -2 uncontrolled          HBA1C 9.1  11-24          on metformin tolerating well         Jardiance caused genital redness / stopped          Started  Ozempic 0.25 weekly last visit          No side effects                  hypertension better on rx no side effects                   Paraesophageal hernia          Surgical consult / not done yet     asthma on rx no side effects     Smoking cessation discussed  quit     hypercholesterolemia on rx no side effects      vit D stable on rx no side effects     H/o seizures in 20's  No cause found and samantha since     diet / exercise rev'd     eyes and feet on  follow up     check CT urogram not done (too $$$)  urology consult not done    Review of Systems   HENT:  Positive for postnasal drip.    Respiratory:  Positive for cough and shortness of breath.    All other systems reviewed and are negative.      Objective   There were no vitals taken for this visit.    Physical Exam  Constitutional:       Appearance: Normal appearance. She is obese.   Pulmonary:      Effort: Pulmonary effort is normal.   Neurological:      Mental Status: She is alert.   Psychiatric:         Mood and Affect: Mood normal.         Assessment/Plan   Problem List Items Addressed This Visit           ICD-10-CM    Acute sinusitis J01.90    Relevant Medications    amoxicillin-clavulanate (Augmentin) 875-125 mg tablet    Hypertension I10    Obese E66.9     Other Visit Diagnoses         Codes      Acute asthmatic bronchitis ([facility])    -  Primary J45.909    Relevant Medications    amoxicillin-clavulanate (Augmentin) 875-125 mg tablet    methylPREDNISolone (Medrol Dospak) 4 mg tablets    benzonatate (Tessalon) 100 mg capsule      Type 2 diabetes mellitus with other specified complication, without long-term current use of insulin     E11.69               Tele health    Same day sick    Sinus drainage discolored x 1 week  Cough clear sputum, wheezing, mild dyspnea  No fever   Acute sinusitis  Acute asthmatic bronchitis  Augmentin 875 mg twice daily #20 no refill  Medrol Dosepak as directed  Tessalon Perles as directed  Risk/benefits reviewed  Rest increase hydration       h/o chest pain, htn resolved          Under stress /           Cardio follow up with stress test neg          EKG SR 80  5-24             DM -2 uncontrolled          HBA1C 9.1  11-24          on metformin tolerating well         Jardiance caused genital redness / stopped          Started  Ozempic 0.25 weekly last visit          No side effects                  hypertension better on rx no side effects                    Paraesophageal hernia          Surgical consult / not done yet     asthma on rx no side effects     Smoking cessation discussed  quit     hypercholesterolemia on rx no side effects      vit D stable on rx no side effects     H/o seizures in 20's  No cause found and samantha since     diet / exercise rev'd     eyes and feet on follow up     check CT urogram not done (too $$$)  urology consult not done      mammogram 9-24  dexa 9-24 penia  colonoscopy 2-25  recheck 2035  GYN n/a  CT lung cancer screening n/a  immunizations rev'd UTD  BMI 39.5    Follow up as scheduled

## 2025-05-17 DIAGNOSIS — J45.901 MODERATE ASTHMA WITH ACUTE EXACERBATION, UNSPECIFIED WHETHER PERSISTENT (HHS-HCC): ICD-10-CM

## 2025-05-19 RX ORDER — BUDESONIDE AND FORMOTEROL FUMARATE DIHYDRATE 160; 4.5 UG/1; UG/1
2 AEROSOL RESPIRATORY (INHALATION) 2 TIMES DAILY
Qty: 10.2 G | Refills: 0 | Status: SHIPPED | OUTPATIENT
Start: 2025-05-19

## 2025-05-22 DIAGNOSIS — E78.00 HYPERCHOLESTEROLEMIA: ICD-10-CM

## 2025-05-22 RX ORDER — ATORVASTATIN CALCIUM 40 MG/1
40 TABLET, FILM COATED ORAL DAILY
Qty: 90 TABLET | Refills: 0 | Status: SHIPPED | OUTPATIENT
Start: 2025-05-22

## 2025-05-24 DIAGNOSIS — I10 HYPERTENSION, UNSPECIFIED TYPE: ICD-10-CM

## 2025-05-26 RX ORDER — LOSARTAN POTASSIUM 25 MG/1
25 TABLET ORAL DAILY
Qty: 90 TABLET | Refills: 0 | Status: SHIPPED | OUTPATIENT
Start: 2025-05-26

## 2025-06-02 DIAGNOSIS — E11.69 TYPE 2 DIABETES MELLITUS WITH OTHER SPECIFIED COMPLICATION, WITHOUT LONG-TERM CURRENT USE OF INSULIN: ICD-10-CM

## 2025-06-02 RX ORDER — SEMAGLUTIDE 0.68 MG/ML
0.25 INJECTION, SOLUTION SUBCUTANEOUS
Qty: 2 ML | Refills: 0 | Status: SHIPPED | OUTPATIENT
Start: 2025-06-02

## 2025-06-12 ENCOUNTER — APPOINTMENT (OUTPATIENT)
Dept: PRIMARY CARE | Facility: CLINIC | Age: 65
End: 2025-06-12
Payer: MEDICARE

## 2025-06-12 VITALS
SYSTOLIC BLOOD PRESSURE: 122 MMHG | WEIGHT: 241.2 LBS | TEMPERATURE: 97.7 F | DIASTOLIC BLOOD PRESSURE: 72 MMHG | HEART RATE: 82 BPM | OXYGEN SATURATION: 95 % | BODY MASS INDEX: 38.93 KG/M2

## 2025-06-12 DIAGNOSIS — I10 HYPERTENSION, UNSPECIFIED TYPE: ICD-10-CM

## 2025-06-12 DIAGNOSIS — J45.909 MODERATE ASTHMA WITHOUT COMPLICATION, UNSPECIFIED WHETHER PERSISTENT (HHS-HCC): ICD-10-CM

## 2025-06-12 DIAGNOSIS — B37.31 CANDIDIASIS OF VULVA AND VAGINA: ICD-10-CM

## 2025-06-12 DIAGNOSIS — E66.01 CLASS 2 SEVERE OBESITY DUE TO EXCESS CALORIES WITH SERIOUS COMORBIDITY AND BODY MASS INDEX (BMI) OF 38.0 TO 38.9 IN ADULT: ICD-10-CM

## 2025-06-12 DIAGNOSIS — J20.9 ACUTE BRONCHITIS, UNSPECIFIED ORGANISM: ICD-10-CM

## 2025-06-12 DIAGNOSIS — E66.812 CLASS 2 SEVERE OBESITY DUE TO EXCESS CALORIES WITH SERIOUS COMORBIDITY AND BODY MASS INDEX (BMI) OF 38.0 TO 38.9 IN ADULT: ICD-10-CM

## 2025-06-12 DIAGNOSIS — E11.69 TYPE 2 DIABETES MELLITUS WITH OTHER SPECIFIED COMPLICATION, WITHOUT LONG-TERM CURRENT USE OF INSULIN: Primary | ICD-10-CM

## 2025-06-12 PROCEDURE — 99214 OFFICE O/P EST MOD 30 MIN: CPT | Performed by: INTERNAL MEDICINE

## 2025-06-12 PROCEDURE — 1160F RVW MEDS BY RX/DR IN RCRD: CPT | Performed by: INTERNAL MEDICINE

## 2025-06-12 PROCEDURE — G2211 COMPLEX E/M VISIT ADD ON: HCPCS | Performed by: INTERNAL MEDICINE

## 2025-06-12 PROCEDURE — 3074F SYST BP LT 130 MM HG: CPT | Performed by: INTERNAL MEDICINE

## 2025-06-12 PROCEDURE — 4010F ACE/ARB THERAPY RXD/TAKEN: CPT | Performed by: INTERNAL MEDICINE

## 2025-06-12 PROCEDURE — 3078F DIAST BP <80 MM HG: CPT | Performed by: INTERNAL MEDICINE

## 2025-06-12 PROCEDURE — 1159F MED LIST DOCD IN RCRD: CPT | Performed by: INTERNAL MEDICINE

## 2025-06-12 RX ORDER — FLUCONAZOLE 150 MG/1
150 TABLET ORAL ONCE
Qty: 1 TABLET | Refills: 1 | Status: SHIPPED | OUTPATIENT
Start: 2025-06-12 | End: 2025-06-12

## 2025-06-12 RX ORDER — SEMAGLUTIDE 0.68 MG/ML
0.25 INJECTION, SOLUTION SUBCUTANEOUS
COMMUNITY
Start: 2025-06-15

## 2025-06-12 RX ORDER — AZITHROMYCIN 250 MG/1
TABLET, FILM COATED ORAL
Qty: 6 TABLET | Refills: 0 | Status: SHIPPED | OUTPATIENT
Start: 2025-06-12 | End: 2025-06-17

## 2025-06-12 ASSESSMENT — ENCOUNTER SYMPTOMS
FATIGUE: 1
COUGH: 1

## 2025-06-12 NOTE — PROGRESS NOTES
Subjective   Patient ID: Kamilah Manzo is a 65 y.o. female who presents for Follow-up (1 month ), Fatigue, Foot Swelling (Off and on x couple weeks), Cough, and Nasal Congestion (Drainage ).  Fatigue  Associated symptoms include coughing and fatigue.   Cough                Follow up                Sick did get well after treated in May              Now tired cough discolored, congestion, and tired              No wheezing, fever or dyspnea              Ankles swelling (did go back to work)              Acute bronchitis              Zpak as directed              Risk / benefits rev'd              Rest increase hydration              Compression socks              Low salt diet                H/o vulvo vaginal candidiasis              Diflucan after z milan as needed                h/o chest pain, htn resolved          Under stress /           Cardio follow up with stress test neg          EKG SR 80  5-24             DM -2 uncontrolled          HBA1C 9.1  11-24          -140's          on metformin tolerating well         Jardiance caused genital redness / stopped          Increase Ozempic 0.5 weekly           No side effects                  hypertension better on rx no side effects                   Paraesophageal hernia          Surgical consult / not done yet     asthma on rx no side effects     Smoking cessation quit     hypercholesterolemia on rx no side effects      vit D stable on rx no side effects     H/o seizures in 20's  No cause found and samantha since     diet / exercise rev'd     eyes and feet on follow up     check CT urogram not done (too $$$)  urology consult not done       Review of Systems   Constitutional:  Positive for fatigue.   Respiratory:  Positive for cough.    All other systems reviewed and are negative.      Objective   /72   Pulse 82   Temp 36.5 °C (97.7 °F)   Wt 109 kg (241 lb 3.2 oz)   SpO2 95%   BMI 38.93 kg/m²   Lab Results   Component Value Date    WBC 7.7  11/29/2024    HGB 14.6 11/29/2024    HCT 46.4 (H) 11/29/2024     11/29/2024    CHOL 153 11/29/2024    TRIG 213 (H) 11/29/2024    HDL 40.3 11/29/2024    ALT 34 11/29/2024    AST 16 11/29/2024     11/29/2024    K 4.4 11/29/2024     11/29/2024    CREATININE 0.61 11/29/2024    BUN 12 11/29/2024    CO2 30 11/29/2024    TSH 1.39 11/29/2024    INR 1.0 07/21/2023    HGBA1C 9.1 (H) 11/29/2024           Physical Exam  Vitals reviewed.   Constitutional:       Appearance: Normal appearance. She is obese.   HENT:      Head: Normocephalic and atraumatic.      Mouth/Throat:      Pharynx: No posterior oropharyngeal erythema.   Eyes:      General: No scleral icterus.     Conjunctiva/sclera: Conjunctivae normal.      Pupils: Pupils are equal, round, and reactive to light.   Cardiovascular:      Rate and Rhythm: Normal rate and regular rhythm.      Heart sounds: Normal heart sounds.   Pulmonary:      Effort: No respiratory distress.      Breath sounds: No wheezing.   Abdominal:      General: Abdomen is flat. Bowel sounds are normal. There is no distension.      Palpations: Abdomen is soft. There is no mass.      Tenderness: There is no abdominal tenderness. There is no rebound.   Musculoskeletal:         General: Normal range of motion.      Cervical back: Normal range of motion and neck supple.   Skin:     General: Skin is warm and dry.   Neurological:      General: No focal deficit present.      Mental Status: She is alert and oriented to person, place, and time. Mental status is at baseline.   Psychiatric:         Mood and Affect: Mood normal.         Behavior: Behavior normal.         Thought Content: Thought content normal.         Judgment: Judgment normal.         Problem List Items Addressed This Visit           ICD-10-CM    Hypertension I10    Obese E66.9     Other Visit Diagnoses         Codes      Type 2 diabetes mellitus with other specified complication, without long-term current use of insulin    -   Primary E11.69    Relevant Medications    semaglutide (Ozempic) 0.25 mg or 0.5 mg (2 mg/3 mL) pen injector (Start on 6/15/2025)      Acute bronchitis, unspecified organism     J20.9    Relevant Medications    azithromycin (Zithromax) 250 mg tablet      Candidiasis of vulva and vagina     B37.31    Relevant Medications    fluconazole (Diflucan) 150 mg tablet      Moderate asthma without complication, unspecified whether persistent ([facility])     J45.909          Assessment/Plan       Follow up                Sick did get well after treated in May              Now tired cough discolored, congestion, and tired              No wheezing, fever or dyspnea              Ankles swelling (did go back to work)              Acute bronchitis              Zpak as directed              Risk / benefits rev'd              Rest increase hydration              Compression socks              Low salt diet                H/o vulvo vaginal candidiasis              Diflucan after z milan as needed                h/o chest pain, htn resolved          Under stress /           Cardio follow up with stress test neg          EKG SR 80  5-24             DM -2 uncontrolled          HBA1C 9.1  11-24          -140's          on metformin tolerating well         Jardiance caused genital redness / stopped          Increase Ozempic 0.5 weekly           No side effects                  hypertension better on rx no side effects                   Paraesophageal hernia          Surgical consult / not done yet     asthma on rx no side effects     Smoking cessation quit     hypercholesterolemia on rx no side effects      vit D stable on rx no side effects     H/o seizures in 20's  No cause found and samantha since     diet / exercise rev'd     eyes and feet on follow up     check CT urogram not done (too $$$)  urology consult not done         mammogram 9-24  dexa 9-24 penia  colonoscopy 2-25  recheck 2035  GYN n/a  CT lung cancer screening  n/a  immunizations rev'd UTD  BMI 38.9    Follow up 6 weeks

## 2025-06-15 DIAGNOSIS — J45.901 MODERATE ASTHMA WITH ACUTE EXACERBATION, UNSPECIFIED WHETHER PERSISTENT (HHS-HCC): ICD-10-CM

## 2025-06-16 RX ORDER — BUDESONIDE AND FORMOTEROL FUMARATE DIHYDRATE 160; 4.5 UG/1; UG/1
2 AEROSOL RESPIRATORY (INHALATION) 2 TIMES DAILY
Qty: 10.2 G | Refills: 1 | Status: ON HOLD | OUTPATIENT
Start: 2025-06-16

## 2025-06-21 ENCOUNTER — HOSPITAL ENCOUNTER (INPATIENT)
Facility: HOSPITAL | Age: 65
DRG: 193 | End: 2025-06-21
Attending: EMERGENCY MEDICINE | Admitting: STUDENT IN AN ORGANIZED HEALTH CARE EDUCATION/TRAINING PROGRAM
Payer: MEDICARE

## 2025-06-21 ENCOUNTER — APPOINTMENT (OUTPATIENT)
Dept: CARDIOLOGY | Facility: HOSPITAL | Age: 65
DRG: 193 | End: 2025-06-21
Payer: MEDICARE

## 2025-06-21 ENCOUNTER — OFFICE VISIT (OUTPATIENT)
Dept: URGENT CARE | Age: 65
End: 2025-06-21
Payer: MEDICARE

## 2025-06-21 ENCOUNTER — APPOINTMENT (OUTPATIENT)
Dept: RADIOLOGY | Facility: HOSPITAL | Age: 65
DRG: 193 | End: 2025-06-21
Payer: MEDICARE

## 2025-06-21 VITALS
HEART RATE: 99 BPM | TEMPERATURE: 97.8 F | BODY MASS INDEX: 37.12 KG/M2 | OXYGEN SATURATION: 90 % | DIASTOLIC BLOOD PRESSURE: 97 MMHG | RESPIRATION RATE: 23 BRPM | WEIGHT: 230 LBS | SYSTOLIC BLOOD PRESSURE: 171 MMHG

## 2025-06-21 DIAGNOSIS — R06.02 SHORTNESS OF BREATH: ICD-10-CM

## 2025-06-21 DIAGNOSIS — R06.82 TACHYPNEA: ICD-10-CM

## 2025-06-21 DIAGNOSIS — J18.9 PNEUMONIA DUE TO INFECTIOUS ORGANISM, UNSPECIFIED LATERALITY, UNSPECIFIED PART OF LUNG: Primary | ICD-10-CM

## 2025-06-21 DIAGNOSIS — R09.02 HYPOXIA: ICD-10-CM

## 2025-06-21 DIAGNOSIS — R09.02 HYPOXIA: Primary | ICD-10-CM

## 2025-06-21 DIAGNOSIS — R05.1 ACUTE COUGH: ICD-10-CM

## 2025-06-21 LAB
ALBUMIN SERPL BCP-MCNC: 3.9 G/DL (ref 3.4–5)
ALP SERPL-CCNC: 85 U/L (ref 33–136)
ALT SERPL W P-5'-P-CCNC: 39 U/L (ref 7–45)
ANION GAP SERPL CALC-SCNC: 13 MMOL/L (ref 10–20)
AST SERPL W P-5'-P-CCNC: 25 U/L (ref 9–39)
BASOPHILS # BLD AUTO: 0.04 X10*3/UL (ref 0–0.1)
BASOPHILS NFR BLD AUTO: 0.5 %
BILIRUB SERPL-MCNC: 0.6 MG/DL (ref 0–1.2)
BNP SERPL-MCNC: 12 PG/ML (ref 0–99)
BUN SERPL-MCNC: 8 MG/DL (ref 6–23)
CALCIUM SERPL-MCNC: 9.4 MG/DL (ref 8.6–10.3)
CARDIAC TROPONIN I PNL SERPL HS: 5 NG/L (ref 0–13)
CARDIAC TROPONIN I PNL SERPL HS: 5 NG/L (ref 0–13)
CHLORIDE SERPL-SCNC: 101 MMOL/L (ref 98–107)
CO2 SERPL-SCNC: 29 MMOL/L (ref 21–32)
CREAT SERPL-MCNC: 0.61 MG/DL (ref 0.5–1.05)
EGFRCR SERPLBLD CKD-EPI 2021: >90 ML/MIN/1.73M*2
EOSINOPHIL # BLD AUTO: 0.1 X10*3/UL (ref 0–0.7)
EOSINOPHIL NFR BLD AUTO: 1.3 %
ERYTHROCYTE [DISTWIDTH] IN BLOOD BY AUTOMATED COUNT: 13.1 % (ref 11.5–14.5)
GLUCOSE BLD MANUAL STRIP-MCNC: 167 MG/DL (ref 74–99)
GLUCOSE SERPL-MCNC: 135 MG/DL (ref 74–99)
HCT VFR BLD AUTO: 47.7 % (ref 36–46)
HGB BLD-MCNC: 15.4 G/DL (ref 12–16)
IMM GRANULOCYTES # BLD AUTO: 0.03 X10*3/UL (ref 0–0.7)
IMM GRANULOCYTES NFR BLD AUTO: 0.4 % (ref 0–0.9)
LYMPHOCYTES # BLD AUTO: 1.99 X10*3/UL (ref 1.2–4.8)
LYMPHOCYTES NFR BLD AUTO: 25.1 %
MCH RBC QN AUTO: 30.7 PG (ref 26–34)
MCHC RBC AUTO-ENTMCNC: 32.3 G/DL (ref 32–36)
MCV RBC AUTO: 95 FL (ref 80–100)
MONOCYTES # BLD AUTO: 0.76 X10*3/UL (ref 0.1–1)
MONOCYTES NFR BLD AUTO: 9.6 %
NEUTROPHILS # BLD AUTO: 5.02 X10*3/UL (ref 1.2–7.7)
NEUTROPHILS NFR BLD AUTO: 63.1 %
NRBC BLD-RTO: 0 /100 WBCS (ref 0–0)
PLATELET # BLD AUTO: 302 X10*3/UL (ref 150–450)
POTASSIUM SERPL-SCNC: 4.1 MMOL/L (ref 3.5–5.3)
PROT SERPL-MCNC: 6.6 G/DL (ref 6.4–8.2)
RBC # BLD AUTO: 5.01 X10*6/UL (ref 4–5.2)
SODIUM SERPL-SCNC: 139 MMOL/L (ref 136–145)
WBC # BLD AUTO: 7.9 X10*3/UL (ref 4.4–11.3)

## 2025-06-21 PROCEDURE — 3077F SYST BP >= 140 MM HG: CPT

## 2025-06-21 PROCEDURE — 2500000005 HC RX 250 GENERAL PHARMACY W/O HCPCS: Performed by: HOSPITALIST

## 2025-06-21 PROCEDURE — 2500000002 HC RX 250 W HCPCS SELF ADMINISTERED DRUGS (ALT 637 FOR MEDICARE OP, ALT 636 FOR OP/ED)

## 2025-06-21 PROCEDURE — 84484 ASSAY OF TROPONIN QUANT: CPT | Performed by: FAMILY MEDICINE

## 2025-06-21 PROCEDURE — 1160F RVW MEDS BY RX/DR IN RCRD: CPT

## 2025-06-21 PROCEDURE — 3080F DIAST BP >= 90 MM HG: CPT

## 2025-06-21 PROCEDURE — 85025 COMPLETE CBC W/AUTO DIFF WBC: CPT | Performed by: EMERGENCY MEDICINE

## 2025-06-21 PROCEDURE — 9420000001 HC RT PATIENT EDUCATION 5 MIN

## 2025-06-21 PROCEDURE — 80053 COMPREHEN METABOLIC PANEL: CPT | Performed by: FAMILY MEDICINE

## 2025-06-21 PROCEDURE — 83036 HEMOGLOBIN GLYCOSYLATED A1C: CPT | Mod: GENLAB | Performed by: HOSPITALIST

## 2025-06-21 PROCEDURE — 80053 COMPREHEN METABOLIC PANEL: CPT | Performed by: EMERGENCY MEDICINE

## 2025-06-21 PROCEDURE — 94664 DEMO&/EVAL PT USE INHALER: CPT | Mod: 59

## 2025-06-21 PROCEDURE — 71046 X-RAY EXAM CHEST 2 VIEWS: CPT | Performed by: RADIOLOGY

## 2025-06-21 PROCEDURE — G0378 HOSPITAL OBSERVATION PER HR: HCPCS

## 2025-06-21 PROCEDURE — 83880 ASSAY OF NATRIURETIC PEPTIDE: CPT | Performed by: FAMILY MEDICINE

## 2025-06-21 PROCEDURE — 94760 N-INVAS EAR/PLS OXIMETRY 1: CPT

## 2025-06-21 PROCEDURE — 4010F ACE/ARB THERAPY RXD/TAKEN: CPT

## 2025-06-21 PROCEDURE — 2500000002 HC RX 250 W HCPCS SELF ADMINISTERED DRUGS (ALT 637 FOR MEDICARE OP, ALT 636 FOR OP/ED): Performed by: HOSPITALIST

## 2025-06-21 PROCEDURE — 36415 COLL VENOUS BLD VENIPUNCTURE: CPT | Performed by: FAMILY MEDICINE

## 2025-06-21 PROCEDURE — 94640 AIRWAY INHALATION TREATMENT: CPT

## 2025-06-21 PROCEDURE — 84484 ASSAY OF TROPONIN QUANT: CPT | Mod: 91 | Performed by: EMERGENCY MEDICINE

## 2025-06-21 PROCEDURE — 93005 ELECTROCARDIOGRAM TRACING: CPT

## 2025-06-21 PROCEDURE — 85025 COMPLETE CBC W/AUTO DIFF WBC: CPT | Performed by: FAMILY MEDICINE

## 2025-06-21 PROCEDURE — 3052F HG A1C>EQUAL 8.0%<EQUAL 9.0%: CPT

## 2025-06-21 PROCEDURE — 71046 X-RAY EXAM CHEST 2 VIEWS: CPT

## 2025-06-21 PROCEDURE — 82947 ASSAY GLUCOSE BLOOD QUANT: CPT | Mod: 59

## 2025-06-21 PROCEDURE — 84484 ASSAY OF TROPONIN QUANT: CPT | Performed by: EMERGENCY MEDICINE

## 2025-06-21 PROCEDURE — 99285 EMERGENCY DEPT VISIT HI MDM: CPT | Mod: 25 | Performed by: EMERGENCY MEDICINE

## 2025-06-21 PROCEDURE — 96367 TX/PROPH/DG ADDL SEQ IV INF: CPT

## 2025-06-21 PROCEDURE — 96366 THER/PROPH/DIAG IV INF ADDON: CPT

## 2025-06-21 PROCEDURE — 93000 ELECTROCARDIOGRAM COMPLETE: CPT

## 2025-06-21 PROCEDURE — 87075 CULTR BACTERIA EXCEPT BLOOD: CPT | Mod: 59,GENLAB

## 2025-06-21 PROCEDURE — 99205 OFFICE O/P NEW HI 60 MIN: CPT

## 2025-06-21 PROCEDURE — 1159F MED LIST DOCD IN RCRD: CPT

## 2025-06-21 PROCEDURE — 96365 THER/PROPH/DIAG IV INF INIT: CPT

## 2025-06-21 PROCEDURE — 87040 BLOOD CULTURE FOR BACTERIA: CPT | Mod: GENLAB

## 2025-06-21 PROCEDURE — 2500000004 HC RX 250 GENERAL PHARMACY W/ HCPCS (ALT 636 FOR OP/ED)

## 2025-06-21 PROCEDURE — 83880 ASSAY OF NATRIURETIC PEPTIDE: CPT | Performed by: EMERGENCY MEDICINE

## 2025-06-21 RX ORDER — BUDESONIDE 0.5 MG/2ML
0.5 INHALANT ORAL
Status: DISCONTINUED | OUTPATIENT
Start: 2025-06-21 | End: 2025-06-23 | Stop reason: HOSPADM

## 2025-06-21 RX ORDER — LOSARTAN POTASSIUM 25 MG/1
25 TABLET ORAL DAILY
Status: DISCONTINUED | OUTPATIENT
Start: 2025-06-22 | End: 2025-06-23 | Stop reason: HOSPADM

## 2025-06-21 RX ORDER — ACETAMINOPHEN 325 MG/1
650 TABLET ORAL EVERY 6 HOURS PRN
Status: DISCONTINUED | OUTPATIENT
Start: 2025-06-21 | End: 2025-06-23 | Stop reason: HOSPADM

## 2025-06-21 RX ORDER — AZITHROMYCIN 250 MG/1
500 TABLET, FILM COATED ORAL EVERY 24 HOURS
Status: DISCONTINUED | OUTPATIENT
Start: 2025-06-22 | End: 2025-06-23 | Stop reason: HOSPADM

## 2025-06-21 RX ORDER — GUAIFENESIN 100 MG/5ML
200 LIQUID ORAL EVERY 4 HOURS PRN
Status: DISCONTINUED | OUTPATIENT
Start: 2025-06-21 | End: 2025-06-23 | Stop reason: HOSPADM

## 2025-06-21 RX ORDER — CHOLECALCIFEROL (VITAMIN D3) 25 MCG
50 TABLET ORAL DAILY
Status: DISCONTINUED | OUTPATIENT
Start: 2025-06-22 | End: 2025-06-23 | Stop reason: HOSPADM

## 2025-06-21 RX ORDER — ONDANSETRON HYDROCHLORIDE 2 MG/ML
4 INJECTION, SOLUTION INTRAVENOUS EVERY 4 HOURS PRN
Status: DISCONTINUED | OUTPATIENT
Start: 2025-06-21 | End: 2025-06-23 | Stop reason: HOSPADM

## 2025-06-21 RX ORDER — IPRATROPIUM BROMIDE AND ALBUTEROL SULFATE 2.5; .5 MG/3ML; MG/3ML
3 SOLUTION RESPIRATORY (INHALATION)
Status: DISCONTINUED | OUTPATIENT
Start: 2025-06-22 | End: 2025-06-23 | Stop reason: HOSPADM

## 2025-06-21 RX ORDER — DEXTROSE 50 % IN WATER (D50W) INTRAVENOUS SYRINGE
25
Status: DISCONTINUED | OUTPATIENT
Start: 2025-06-21 | End: 2025-06-23 | Stop reason: HOSPADM

## 2025-06-21 RX ORDER — INSULIN LISPRO 100 [IU]/ML
0-10 INJECTION, SOLUTION INTRAVENOUS; SUBCUTANEOUS
Status: DISCONTINUED | OUTPATIENT
Start: 2025-06-21 | End: 2025-06-23 | Stop reason: HOSPADM

## 2025-06-21 RX ORDER — ACETAMINOPHEN 500 MG
10 TABLET ORAL DAILY PRN
Status: DISCONTINUED | OUTPATIENT
Start: 2025-06-21 | End: 2025-06-23 | Stop reason: HOSPADM

## 2025-06-21 RX ORDER — METFORMIN HYDROCHLORIDE 500 MG/1
500 TABLET ORAL
Status: DISCONTINUED | OUTPATIENT
Start: 2025-06-22 | End: 2025-06-23 | Stop reason: HOSPADM

## 2025-06-21 RX ORDER — AZITHROMYCIN MONOHYDRATE 500 MG/5ML
INJECTION, POWDER, LYOPHILIZED, FOR SOLUTION INTRAVENOUS
Status: DISPENSED
Start: 2025-06-21 | End: 2025-06-22

## 2025-06-21 RX ORDER — IPRATROPIUM BROMIDE AND ALBUTEROL SULFATE 2.5; .5 MG/3ML; MG/3ML
3 SOLUTION RESPIRATORY (INHALATION)
Status: DISCONTINUED | OUTPATIENT
Start: 2025-06-21 | End: 2025-06-21

## 2025-06-21 RX ORDER — ATORVASTATIN CALCIUM 40 MG/1
40 TABLET, FILM COATED ORAL DAILY
Status: DISCONTINUED | OUTPATIENT
Start: 2025-06-22 | End: 2025-06-23 | Stop reason: HOSPADM

## 2025-06-21 RX ORDER — CEFTRIAXONE 1 G/50ML
1 INJECTION, SOLUTION INTRAVENOUS ONCE
Status: COMPLETED | OUTPATIENT
Start: 2025-06-21 | End: 2025-06-21

## 2025-06-21 RX ORDER — DEXTROSE 50 % IN WATER (D50W) INTRAVENOUS SYRINGE
12.5
Status: DISCONTINUED | OUTPATIENT
Start: 2025-06-21 | End: 2025-06-23 | Stop reason: HOSPADM

## 2025-06-21 RX ORDER — ENOXAPARIN SODIUM 100 MG/ML
40 INJECTION SUBCUTANEOUS DAILY
Status: DISCONTINUED | OUTPATIENT
Start: 2025-06-22 | End: 2025-06-23 | Stop reason: HOSPADM

## 2025-06-21 RX ORDER — POLYETHYLENE GLYCOL 3350 17 G/17G
17 POWDER, FOR SOLUTION ORAL 2 TIMES DAILY PRN
Status: DISCONTINUED | OUTPATIENT
Start: 2025-06-21 | End: 2025-06-23 | Stop reason: HOSPADM

## 2025-06-21 RX ORDER — ALUMINUM HYDROXIDE, MAGNESIUM HYDROXIDE, AND SIMETHICONE 1200; 120; 1200 MG/30ML; MG/30ML; MG/30ML
20 SUSPENSION ORAL EVERY 4 HOURS PRN
Status: DISCONTINUED | OUTPATIENT
Start: 2025-06-21 | End: 2025-06-23 | Stop reason: HOSPADM

## 2025-06-21 RX ORDER — CEFTRIAXONE 1 G/50ML
1 INJECTION, SOLUTION INTRAVENOUS EVERY 24 HOURS
Status: DISCONTINUED | OUTPATIENT
Start: 2025-06-22 | End: 2025-06-23 | Stop reason: HOSPADM

## 2025-06-21 RX ADMIN — IPRATROPIUM BROMIDE AND ALBUTEROL SULFATE 3 ML: .5; 3 SOLUTION RESPIRATORY (INHALATION) at 19:39

## 2025-06-21 RX ADMIN — Medication 2 L/MIN: at 22:56

## 2025-06-21 RX ADMIN — AZITHROMYCIN 500 MG: 500 INJECTION, POWDER, LYOPHILIZED, FOR SOLUTION INTRAVENOUS at 21:30

## 2025-06-21 RX ADMIN — BUDESONIDE 0.5 MG: 0.5 INHALANT RESPIRATORY (INHALATION) at 22:56

## 2025-06-21 RX ADMIN — CEFTRIAXONE 1 G: 1 INJECTION, SOLUTION INTRAVENOUS at 20:29

## 2025-06-21 SDOH — SOCIAL STABILITY: SOCIAL INSECURITY: ARE YOU OR HAVE YOU BEEN THREATENED OR ABUSED PHYSICALLY, EMOTIONALLY, OR SEXUALLY BY ANYONE?: NO

## 2025-06-21 SDOH — SOCIAL STABILITY: SOCIAL INSECURITY: DO YOU FEEL ANYONE HAS EXPLOITED OR TAKEN ADVANTAGE OF YOU FINANCIALLY OR OF YOUR PERSONAL PROPERTY?: NO

## 2025-06-21 SDOH — ECONOMIC STABILITY: FOOD INSECURITY: WITHIN THE PAST 12 MONTHS, YOU WORRIED THAT YOUR FOOD WOULD RUN OUT BEFORE YOU GOT THE MONEY TO BUY MORE.: NEVER TRUE

## 2025-06-21 SDOH — SOCIAL STABILITY: SOCIAL INSECURITY
WITHIN THE LAST YEAR, HAVE YOU BEEN RAPED OR FORCED TO HAVE ANY KIND OF SEXUAL ACTIVITY BY YOUR PARTNER OR EX-PARTNER?: NO

## 2025-06-21 SDOH — SOCIAL STABILITY: SOCIAL INSECURITY: WITHIN THE LAST YEAR, HAVE YOU BEEN HUMILIATED OR EMOTIONALLY ABUSED IN OTHER WAYS BY YOUR PARTNER OR EX-PARTNER?: NO

## 2025-06-21 SDOH — ECONOMIC STABILITY: INCOME INSECURITY: IN THE PAST 12 MONTHS HAS THE ELECTRIC, GAS, OIL, OR WATER COMPANY THREATENED TO SHUT OFF SERVICES IN YOUR HOME?: NO

## 2025-06-21 SDOH — ECONOMIC STABILITY: HOUSING INSECURITY: IN THE LAST 12 MONTHS, WAS THERE A TIME WHEN YOU WERE NOT ABLE TO PAY THE MORTGAGE OR RENT ON TIME?: NO

## 2025-06-21 SDOH — SOCIAL STABILITY: SOCIAL INSECURITY
WITHIN THE LAST YEAR, HAVE YOU BEEN KICKED, HIT, SLAPPED, OR OTHERWISE PHYSICALLY HURT BY YOUR PARTNER OR EX-PARTNER?: NO

## 2025-06-21 SDOH — ECONOMIC STABILITY: FOOD INSECURITY: WITHIN THE PAST 12 MONTHS, THE FOOD YOU BOUGHT JUST DIDN'T LAST AND YOU DIDN'T HAVE MONEY TO GET MORE.: NEVER TRUE

## 2025-06-21 SDOH — SOCIAL STABILITY: SOCIAL INSECURITY: HAVE YOU HAD THOUGHTS OF HARMING ANYONE ELSE?: NO

## 2025-06-21 SDOH — ECONOMIC STABILITY: HOUSING INSECURITY: AT ANY TIME IN THE PAST 12 MONTHS, WERE YOU HOMELESS OR LIVING IN A SHELTER (INCLUDING NOW)?: NO

## 2025-06-21 SDOH — ECONOMIC STABILITY: HOUSING INSECURITY: IN THE PAST 12 MONTHS, HOW MANY TIMES HAVE YOU MOVED WHERE YOU WERE LIVING?: 1

## 2025-06-21 SDOH — SOCIAL STABILITY: SOCIAL INSECURITY: WITHIN THE LAST YEAR, HAVE YOU BEEN AFRAID OF YOUR PARTNER OR EX-PARTNER?: NO

## 2025-06-21 SDOH — ECONOMIC STABILITY: FOOD INSECURITY: HOW HARD IS IT FOR YOU TO PAY FOR THE VERY BASICS LIKE FOOD, HOUSING, MEDICAL CARE, AND HEATING?: NOT HARD AT ALL

## 2025-06-21 SDOH — SOCIAL STABILITY: SOCIAL INSECURITY: ABUSE: ADULT

## 2025-06-21 SDOH — SOCIAL STABILITY: SOCIAL INSECURITY: HAVE YOU HAD ANY THOUGHTS OF HARMING ANYONE ELSE?: NO

## 2025-06-21 SDOH — SOCIAL STABILITY: SOCIAL INSECURITY: WERE YOU ABLE TO COMPLETE ALL THE BEHAVIORAL HEALTH SCREENINGS?: YES

## 2025-06-21 SDOH — SOCIAL STABILITY: SOCIAL INSECURITY: DOES ANYONE TRY TO KEEP YOU FROM HAVING/CONTACTING OTHER FRIENDS OR DOING THINGS OUTSIDE YOUR HOME?: NO

## 2025-06-21 SDOH — SOCIAL STABILITY: SOCIAL INSECURITY: DO YOU FEEL UNSAFE GOING BACK TO THE PLACE WHERE YOU ARE LIVING?: NO

## 2025-06-21 SDOH — SOCIAL STABILITY: SOCIAL INSECURITY: HAS ANYONE EVER THREATENED TO HURT YOUR FAMILY OR YOUR PETS?: NO

## 2025-06-21 SDOH — SOCIAL STABILITY: SOCIAL INSECURITY: ARE THERE ANY APPARENT SIGNS OF INJURIES/BEHAVIORS THAT COULD BE RELATED TO ABUSE/NEGLECT?: NO

## 2025-06-21 SDOH — ECONOMIC STABILITY: TRANSPORTATION INSECURITY: IN THE PAST 12 MONTHS, HAS LACK OF TRANSPORTATION KEPT YOU FROM MEDICAL APPOINTMENTS OR FROM GETTING MEDICATIONS?: NO

## 2025-06-21 ASSESSMENT — COGNITIVE AND FUNCTIONAL STATUS - GENERAL
PATIENT BASELINE BEDBOUND: NO
DAILY ACTIVITIY SCORE: 24
MOBILITY SCORE: 24

## 2025-06-21 ASSESSMENT — LIFESTYLE VARIABLES
HOW OFTEN DO YOU HAVE 6 OR MORE DRINKS ON ONE OCCASION: NEVER
HOW OFTEN DO YOU HAVE A DRINK CONTAINING ALCOHOL: NEVER
AUDIT-C TOTAL SCORE: 0
SKIP TO QUESTIONS 9-10: 1
AUDIT-C TOTAL SCORE: 0
HOW MANY STANDARD DRINKS CONTAINING ALCOHOL DO YOU HAVE ON A TYPICAL DAY: PATIENT DOES NOT DRINK

## 2025-06-21 ASSESSMENT — ACTIVITIES OF DAILY LIVING (ADL)
BATHING: INDEPENDENT
LACK_OF_TRANSPORTATION: NO
DRESSING YOURSELF: INDEPENDENT
GROOMING: INDEPENDENT
ADEQUATE_TO_COMPLETE_ADL: YES
PATIENT'S MEMORY ADEQUATE TO SAFELY COMPLETE DAILY ACTIVITIES?: YES
HEARING - LEFT EAR: FUNCTIONAL
TOILETING: INDEPENDENT
FEEDING YOURSELF: INDEPENDENT
HEARING - RIGHT EAR: FUNCTIONAL
WALKS IN HOME: INDEPENDENT
LACK_OF_TRANSPORTATION: NO
JUDGMENT_ADEQUATE_SAFELY_COMPLETE_DAILY_ACTIVITIES: YES

## 2025-06-21 ASSESSMENT — PAIN SCALES - GENERAL
PAINLEVEL_OUTOF10: 0 - NO PAIN
PAINLEVEL_OUTOF10: 0 - NO PAIN

## 2025-06-21 ASSESSMENT — PAIN - FUNCTIONAL ASSESSMENT
PAIN_FUNCTIONAL_ASSESSMENT: 0-10
PAIN_FUNCTIONAL_ASSESSMENT: 0-10

## 2025-06-21 ASSESSMENT — PATIENT HEALTH QUESTIONNAIRE - PHQ9
1. LITTLE INTEREST OR PLEASURE IN DOING THINGS: NOT AT ALL
SUM OF ALL RESPONSES TO PHQ9 QUESTIONS 1 & 2: 0
2. FEELING DOWN, DEPRESSED OR HOPELESS: NOT AT ALL

## 2025-06-21 NOTE — ED TRIAGE NOTES
Pt states she has been battling cold symptoms for about 6 weeks on and off. Pt was on abx and then her symptoms returned after the abx were finished. Pt states currently she has no energy, congestion, cough, SOB. Denies any CP. Pt was sent to ED by urgent care for further evaluation because they said she was hypoxic. Pt currently 94% RA

## 2025-06-21 NOTE — PROGRESS NOTES
Subjective   Patient ID: Kamilah Manzo is a 65 y.o. female. They present today with a chief complaint of Illness (6 weeks ago-- was sick, like this. /Was given a zpak a week ago./Coming back-- thick mucus, runny nose, cough.- thursday night ).    History of Present Illness  65-year-old female presents urgent care for continuing illness with productive cough, runny nose, shortness of breath worsening with exertion.  States he was given Z-Jan a week ago states felt somewhat better but is worsening again since Thursday night.  Denies any current fevers, chest pain, palpitations, abdominal pain, focal deficits, nausea or vomiting.  EKG shows sinus rhythm (rapid) with left axis deviation, heart rate 95 bpm,  ms, QRS 86 ms,  ms, QRS axis -49 degrees.  Given patient has hypoxia/other abnormal vitals and failed outpatient treatment she is referred to emergency department.  She was highly encouraged to take ambulance transfer given her abnormal vitals but ultimately declined and signed refusal of ambulance.  States she is still agreeable to going to the ER.  ER was called to give report.  Dr. Resendez was notified.          Past Medical History  Allergies as of 06/21/2025 - Reviewed 06/21/2025   Allergen Reaction Noted    Propoxyphene-acetaminophen Unknown 06/09/2023    Tetanus vaccines and toxoid Other 06/09/2023    Doxycycline Rash 06/09/2023    Propoxyphene Unknown and Rash 06/09/2023       Prescriptions Prior to Admission[1]     Medical History[2]    Surgical History[3]     reports that she has quit smoking. Her smoking use included cigarettes. She has been exposed to tobacco smoke. She has never used smokeless tobacco. She reports that she does not drink alcohol and does not use drugs.    Review of Systems  Review of Systems   All other systems reviewed and are negative.                                 Objective    Vitals:    06/21/25 1658   BP: (!) 171/97   BP Location: Other (Comment)   Patient  Position: Sitting   BP Cuff Size: Adult   Pulse: 99   Resp: 23   Temp: 36.6 °C (97.8 °F)   TempSrc: Oral   SpO2: 90%   Weight: 104 kg (230 lb)     No LMP recorded (lmp unknown). Patient is postmenopausal.    Physical Exam  Vitals reviewed.   Constitutional:       General: She is not in acute distress.     Appearance: Normal appearance. She is not ill-appearing, toxic-appearing or diaphoretic.   HENT:      Head: Normocephalic and atraumatic.      Nose: Nose normal.      Mouth/Throat:      Mouth: Mucous membranes are moist.      Pharynx: Oropharynx is clear.      Comments: Airway clear.  Cardiovascular:      Rate and Rhythm: Regular rhythm. Tachycardia present.   Pulmonary:      Effort: Pulmonary effort is normal. No respiratory distress.      Breath sounds: No stridor. Wheezing present. No rhonchi or rales.      Comments: Slightly diminished lung sounds with slight wheeze bilateral upper and lower lobes  Abdominal:      General: Abdomen is flat.      Palpations: Abdomen is soft.      Tenderness: There is no abdominal tenderness. There is no right CVA tenderness or left CVA tenderness.   Musculoskeletal:      Cervical back: Normal range of motion and neck supple. No rigidity or tenderness.   Lymphadenopathy:      Cervical: No cervical adenopathy.   Skin:     General: Skin is warm and dry.   Neurological:      General: No focal deficit present.      Mental Status: She is alert and oriented to person, place, and time.   Psychiatric:         Mood and Affect: Mood normal.         Behavior: Behavior normal.         Procedures    Point of Care Test & Imaging Results from this visit  No results found for this visit on 06/21/25.   Imaging  No results found.    Cardiology, Vascular, and Other Imaging  No other imaging results found for the past 2 days      Diagnostic study results (if any) were reviewed by Salima Sandoval PA-C.    Assessment/Plan   Allergies, medications, history, and pertinent labs/EKGs/Imaging reviewed  by Salima Sandoval PA-C.     Medical Decision Making  65-year-old female presents urgent care for continuing illness with productive cough, runny nose, shortness of breath worsening with exertion.  States he was given Z-Jan a week ago states felt somewhat better but is worsening again since Thursday night.  Denies any current fevers, chest pain, palpitations, abdominal pain, focal deficits, nausea or vomiting.  EKG shows sinus rhythm (rapid) with left axis deviation, heart rate 95 bpm,  ms, QRS 86 ms,  ms, QRS axis -49 degrees.  Given patient has hypoxia/other abnormal vitals and failed outpatient treatment she is referred to emergency department.  She was highly encouraged to take ambulance transfer given her abnormal vitals but ultimately declined and signed refusal of ambulance.  States she is still agreeable to going to the ER.  ER was called to give report.  Dr. Resendez was notified.    Orders and Diagnoses  There are no diagnoses linked to this encounter.    Medical Admin Record      Patient disposition: ED    Electronically signed by Salima Sandoval PA-C  5:05 PM           [1] (Not in a hospital admission)  [2]   Past Medical History:  Diagnosis Date    Benign neoplasm of colon, unspecified 02/28/2014    Tubular adenoma of colon    Diverticulosis of large intestine without perforation or abscess without bleeding 12/18/2013    Diverticulosis of colon    Encounter for screening for malignant neoplasm of colon 10/02/2013    Encounter for screening for malignant neoplasm of colon    Gastro-esophageal reflux disease without esophagitis 12/02/2014    GERD (gastroesophageal reflux disease)    Migraine, unspecified, not intractable, without status migrainosus 01/14/2019    Migraine headache    Other conditions influencing health status 04/19/2022    Diabetes mellitus type II, uncontrolled    Pure hypercholesterolemia, unspecified 12/12/2022    Hypercholesterolemia    Seizure disorder  (Multi)     had seizures in her 20's    Type 2 diabetes mellitus     Unspecified asthma, uncomplicated ([facility]) 06/08/2022    AB (asthmatic bronchitis)    Unspecified convulsions (Multi) 02/28/2014    Seizures    Vitamin D deficiency, unspecified 12/12/2022    Vitamin D deficiency   [3]   Past Surgical History:  Procedure Laterality Date    BREAST BIOPSY Right     COLONOSCOPY  08/14/2015    Complete Colonoscopy    DILATION AND CURETTAGE OF UTERUS  10/02/2013    Dilation And Curettage Of Cervical Stump    TONSILLECTOMY  02/28/2014    Tonsillectomy    TOTAL ABDOMINAL HYSTERECTOMY W/ BILATERAL SALPINGOOPHORECTOMY  02/28/2014    Total Abdominal Hysterectomy With Removal Of Both Ovaries

## 2025-06-21 NOTE — ED PROVIDER NOTES
Limitations to history: None  Independent Historians: Family  External Records Reviewed: HIE, OARRS, outpatient notes, inpatient notes, paper charts if needed    History of Present Illness:  Patient is a 65-year-old female with a past medical history positive for GERD, type 2 diabetes, umbilical hernia, pneumonia presents to ED chief complaint of shortness of breath, cough, nasal congestion for about 6 weeks.  Reports she was initially placed on antibiotics by her primary care provider, reports that she initially felt better.  Reports that her symptoms then came back after a couple weeks.  Patient denies any known fevers, chills, chest pain, nausea, vomiting, diarrhea and/or abdominal pain.  Patient denies use of blood thinners.  Patient is alert and oriented x 3 upon examination otherwise no acute distress.  Denies HA, C/P, SOB, ABD pain, Nausea, Vomiting, Diarrhea, Weakness, Dizziness, Fever, Chills.    PMFSH:   As per HPI, otherwise nurses notes reviewed in EMR    Physical Exam:  Appearance: Alert, oriented x3, supine on exam table with head elevated, cooperative, in no acute distress. Well nourished & well hydrated.      Skin: Intact, dry skin, no lesions, rash, petechiae or purpura.     Eyes: PERRLA, EOMs intact, Conjunctiva pink with no redness or exudates. No scleral icterus.     Ears: Hearing grossly intact.      Nose: Nares patent, no epistaxis.     Mouth: Dentition without concerning abnormalities. no obstruction of posterior pharynx.     Neck: Supple, without meningismus. Trachea at midline.     Pulmonary: Inspiratory wheezing auscultated in right upper lung field.  No accessory muscle use or stridor. Talking in full sentences.     Cardiac: Normal S1, S2 without murmur, rub, gallop or extrasystole.     Abdomen: Soft, nontender to light and deep palpation to all quadrants, normoactive bowel sounds.  No palpable organomegaly.  No rebound or guarding.     Genitourinary: Physical exam deferred.      Musculoskeletal: Normal gait. Full range of motion to all extremities. Rest of the exam reveals no pain on palpation, instability, or deformity. Pulses full and equal. No cyanosis or clubbing. capillary refill <2 seconds to all examined digits.     Neurological:  Cranial nerves II through XII are grossly intact, normal sensation, no weakness, no focal findings identified.      Psychiatric: Appropriate mood and affect.    EKG interpreted by me shows Normal sinus rhythm, no ST abnormalities noted.  Ventricular rate of 97  bpm  MN interval   112             ms  QTc       272/345                     ms  No T wave elevation or depression    Labs Reviewed   CBC WITH AUTO DIFFERENTIAL - Abnormal       Result Value    WBC 7.9      nRBC 0.0      RBC 5.01      Hemoglobin 15.4      Hematocrit 47.7 (*)     MCV 95      MCH 30.7      MCHC 32.3      RDW 13.1      Platelets 302      Neutrophils % 63.1      Immature Granulocytes %, Automated 0.4      Lymphocytes % 25.1      Monocytes % 9.6      Eosinophils % 1.3      Basophils % 0.5      Neutrophils Absolute 5.02      Immature Granulocytes Absolute, Automated 0.03      Lymphocytes Absolute 1.99      Monocytes Absolute 0.76      Eosinophils Absolute 0.10      Basophils Absolute 0.04     COMPREHENSIVE METABOLIC PANEL - Abnormal    Glucose 135 (*)     Sodium 139      Potassium 4.1      Chloride 101      Bicarbonate 29      Anion Gap 13      Urea Nitrogen 8      Creatinine 0.61      eGFR >90      Calcium 9.4      Albumin 3.9      Alkaline Phosphatase 85      Total Protein 6.6      AST 25      Bilirubin, Total 0.6      ALT 39     B-TYPE NATRIURETIC PEPTIDE - Normal    BNP 12      Narrative:        <100 pg/mL - Heart failure unlikely  100-299 pg/mL - Intermediate probability of acute heart                  failure exacerbation. Correlate with clinical                  context and patient history.    >=300 pg/mL - Heart Failure likely. Correlate with clinical                  context and  patient history.    BNP testing is performed using different testing methodology at [facility] than at other [facility]. Direct result comparisons should only be made within the same method.      SERIAL TROPONIN-INITIAL - Normal    Troponin I, High Sensitivity 5      Narrative:     Less than 99th percentile of normal range cutoff-  Female and children under 18 years old <14 ng/L; Male <21 ng/L: Negative  Repeat testing should be performed if clinically indicated.     Female and children under 18 years old 14-50 ng/L; Male 21-50 ng/L:  Consistent with possible cardiac damage and possible increased clinical   risk. Serial measurements may help to assess extent of myocardial damage.     >50 ng/L: Consistent with cardiac damage, increased clinical risk and  myocardial infarction. Serial measurements may help assess extent of   myocardial damage.      NOTE: Children less than 1 year old may have higher baseline troponin   levels and results should be interpreted in conjunction with the overall   clinical context.     NOTE: Troponin I testing is performed using a different   testing methodology at [facility] than at other   [facility]. Direct result comparisons should only   be made within the same method.   SERIAL TROPONIN, 1 HOUR - Normal    Troponin I, High Sensitivity 5      Narrative:     Less than 99th percentile of normal range cutoff-  Female and children under 18 years old <14 ng/L; Male <21 ng/L: Negative  Repeat testing should be performed if clinically indicated.     Female and children under 18 years old 14-50 ng/L; Male 21-50 ng/L:  Consistent with possible cardiac damage and possible increased clinical   risk. Serial measurements may help to assess extent of myocardial damage.     >50 ng/L: Consistent with cardiac damage, increased clinical risk and  myocardial infarction. Serial measurements may help assess extent of   myocardial damage.      NOTE: Children less than  1 year old may have higher baseline troponin   levels and results should be interpreted in conjunction with the overall   clinical context.     NOTE: Troponin I testing is performed using a different   testing methodology at [facility] than at other   [facility] hospitals. Direct result comparisons should only   be made within the same method.   BLOOD CULTURE   BLOOD CULTURE   TROPONIN SERIES- (INITIAL, 1 HR)    Narrative:     The following orders were created for panel order Troponin I Series, High Sensitivity (0, 1 HR).  Procedure                               Abnormality         Status                     ---------                               -----------         ------                     Troponin I, High Sensiti...[[id number]]  Normal              Final result               Troponin, High Sensitivi...[[id number]]  Normal              Final result                 Please view results for these tests on the individual orders.      XR chest 2 views   Final Result   1. Mild left basilar patchy airspace disease may represent pneumonia   or atelectasis. Small left pleural effusion or pleural thickening                  MACRO:   None        Signed by: Coriwn Fleming 6/21/2025 7:49 PM   Dictation workstation:   [id number]             Repeat Evaluation below    Summary:  Medical Decision Making:   Patient presented as described in HPI. Patient case including ROS, PE, and treatment and plan discussed with ED attending if attached as cosigner. Due to patients presentation orders completed include as documented. Patient evaluated for complaints of shortness of breath, cough, congestion that has been ongoing for the past 6 weeks.  Patient reports she is taken multiple antibiotics, and continues to feel worse.  Patient found to be afebrile, nontachycardic, mildly hypoxic.  Patient reports she does not wear home O2.  Lab work in the ED revealed no evidence of leukocytosis present, no electrolyte imbalances noted,  delta troponin negative.  Chest x-ray revealed mild left basilar patchy airspace disease represents pneumonia.  Patient given IV Rocephin, IV azithromycin, case findings also discussed with ED attending Dr. Anderson.  Plan is to admit for further evaluation treatment of pneumonia, hypoxia, shortness of breath, failed outpatient treatment.    Diagnoses as of 06/21/25 2021   Pneumonia due to infectious organism, unspecified laterality, unspecified part of lung   Hypoxia   Shortness of breath   Acute cough        Tests/Medications/Escalations of Care considered but not given:    Patient care discussed with: N/A  Social Determinants affecting care: N/A    Final diagnosis and disposition as documented in impression         Disposition:  admit     Hospitalize to _ floor under DrTyra _ per their orders. Discussed findings and treatment done here in ED with admitting physician. It would be a risk to discharge the patient in their condition due to possibility of deterioration in their condition and the need for urgent interventions.    This note has been transcribed using voice recognition and may contain grammatical errors, misplaced words, incorrect words, incorrect phrases or other errors.     Cyndie Reed, SUAD-CNP  06/21/25 2022

## 2025-06-22 VITALS
OXYGEN SATURATION: 92 % | DIASTOLIC BLOOD PRESSURE: 89 MMHG | BODY MASS INDEX: 36.96 KG/M2 | TEMPERATURE: 97 F | SYSTOLIC BLOOD PRESSURE: 122 MMHG | HEIGHT: 66 IN | RESPIRATION RATE: 17 BRPM | HEART RATE: 99 BPM | WEIGHT: 230 LBS

## 2025-06-22 PROBLEM — J96.01 ACUTE HYPOXIC RESPIRATORY FAILURE: Status: ACTIVE | Noted: 2025-06-22

## 2025-06-22 PROBLEM — Z78.9 FAILURE OF OUTPATIENT TREATMENT: Status: ACTIVE | Noted: 2025-06-22

## 2025-06-22 PROBLEM — J18.9 PNEUMONIA DUE TO INFECTIOUS ORGANISM, UNSPECIFIED LATERALITY, UNSPECIFIED PART OF LUNG: Status: ACTIVE | Noted: 2025-06-22

## 2025-06-22 LAB
ANION GAP SERPL CALC-SCNC: 13 MMOL/L (ref 10–20)
BACTERIA BLD CULT: NORMAL
BACTERIA BLD CULT: NORMAL
BUN SERPL-MCNC: 7 MG/DL (ref 6–23)
CALCIUM SERPL-MCNC: 8.9 MG/DL (ref 8.6–10.3)
CHLORIDE SERPL-SCNC: 101 MMOL/L (ref 98–107)
CO2 SERPL-SCNC: 29 MMOL/L (ref 21–32)
CREAT SERPL-MCNC: 0.59 MG/DL (ref 0.5–1.05)
EGFRCR SERPLBLD CKD-EPI 2021: >90 ML/MIN/1.73M*2
ERYTHROCYTE [DISTWIDTH] IN BLOOD BY AUTOMATED COUNT: 13.2 % (ref 11.5–14.5)
EST. AVERAGE GLUCOSE BLD GHB EST-MCNC: 186 MG/DL
GLUCOSE BLD MANUAL STRIP-MCNC: 188 MG/DL (ref 74–99)
GLUCOSE BLD MANUAL STRIP-MCNC: 232 MG/DL (ref 74–99)
GLUCOSE BLD MANUAL STRIP-MCNC: 317 MG/DL (ref 74–99)
GLUCOSE BLD MANUAL STRIP-MCNC: 339 MG/DL (ref 74–99)
GLUCOSE SERPL-MCNC: 194 MG/DL (ref 74–99)
HBA1C MFR BLD: 8.1 % (ref ?–5.7)
HCT VFR BLD AUTO: 44.8 % (ref 36–46)
HGB BLD-MCNC: 14.4 G/DL (ref 12–16)
HOLD SPECIMEN: NORMAL
HOLD SPECIMEN: NORMAL
MCH RBC QN AUTO: 31 PG (ref 26–34)
MCHC RBC AUTO-ENTMCNC: 32.1 G/DL (ref 32–36)
MCV RBC AUTO: 96 FL (ref 80–100)
NRBC BLD-RTO: 0 /100 WBCS (ref 0–0)
PLATELET # BLD AUTO: 285 X10*3/UL (ref 150–450)
POTASSIUM SERPL-SCNC: 3.7 MMOL/L (ref 3.5–5.3)
RBC # BLD AUTO: 4.65 X10*6/UL (ref 4–5.2)
SODIUM SERPL-SCNC: 139 MMOL/L (ref 136–145)
WBC # BLD AUTO: 7.2 X10*3/UL (ref 4.4–11.3)

## 2025-06-22 PROCEDURE — 36415 COLL VENOUS BLD VENIPUNCTURE: CPT

## 2025-06-22 PROCEDURE — 82947 ASSAY GLUCOSE BLOOD QUANT: CPT | Mod: IPSPLIT

## 2025-06-22 PROCEDURE — 94640 AIRWAY INHALATION TREATMENT: CPT | Mod: IPSPLIT

## 2025-06-22 PROCEDURE — 2500000004 HC RX 250 GENERAL PHARMACY W/ HCPCS (ALT 636 FOR OP/ED): Mod: IPSPLIT | Performed by: HOSPITALIST

## 2025-06-22 PROCEDURE — 94640 AIRWAY INHALATION TREATMENT: CPT

## 2025-06-22 PROCEDURE — 80048 BASIC METABOLIC PNL TOTAL CA: CPT

## 2025-06-22 PROCEDURE — 2500000002 HC RX 250 W HCPCS SELF ADMINISTERED DRUGS (ALT 637 FOR MEDICARE OP, ALT 636 FOR OP/ED): Performed by: HOSPITALIST

## 2025-06-22 PROCEDURE — 82947 ASSAY GLUCOSE BLOOD QUANT: CPT | Mod: 59

## 2025-06-22 PROCEDURE — 96375 TX/PRO/DX INJ NEW DRUG ADDON: CPT

## 2025-06-22 PROCEDURE — 85027 COMPLETE CBC AUTOMATED: CPT

## 2025-06-22 PROCEDURE — 99223 1ST HOSP IP/OBS HIGH 75: CPT

## 2025-06-22 PROCEDURE — 96372 THER/PROPH/DIAG INJ SC/IM: CPT | Performed by: HOSPITALIST

## 2025-06-22 PROCEDURE — 2500000001 HC RX 250 WO HCPCS SELF ADMINISTERED DRUGS (ALT 637 FOR MEDICARE OP): Performed by: HOSPITALIST

## 2025-06-22 PROCEDURE — 1200000002 HC GENERAL ROOM WITH TELEMETRY DAILY: Mod: IPSPLIT

## 2025-06-22 PROCEDURE — 2500000004 HC RX 250 GENERAL PHARMACY W/ HCPCS (ALT 636 FOR OP/ED): Mod: JZ,IPSPLIT

## 2025-06-22 PROCEDURE — 84145 PROCALCITONIN (PCT): CPT | Mod: GENLAB

## 2025-06-22 PROCEDURE — 2500000005 HC RX 250 GENERAL PHARMACY W/O HCPCS: Performed by: HOSPITALIST

## 2025-06-22 PROCEDURE — 94760 N-INVAS EAR/PLS OXIMETRY 1: CPT | Mod: IPSPLIT

## 2025-06-22 RX ORDER — SODIUM CHLORIDE 9 MG/ML
10 INJECTION, SOLUTION INTRAVENOUS CONTINUOUS PRN
Status: DISCONTINUED | OUTPATIENT
Start: 2025-06-22 | End: 2025-06-23 | Stop reason: HOSPADM

## 2025-06-22 RX ADMIN — Medication 2 L/MIN: at 09:41

## 2025-06-22 RX ADMIN — METHYLPREDNISOLONE SODIUM SUCCINATE 40 MG: 40 INJECTION, POWDER, FOR SOLUTION INTRAMUSCULAR; INTRAVENOUS at 17:50

## 2025-06-22 RX ADMIN — CEFTRIAXONE 1 G: 1 INJECTION, SOLUTION INTRAVENOUS at 20:50

## 2025-06-22 RX ADMIN — ATORVASTATIN CALCIUM 40 MG: 40 TABLET, FILM COATED ORAL at 09:36

## 2025-06-22 RX ADMIN — INSULIN LISPRO 4 UNITS: 100 INJECTION, SOLUTION INTRAVENOUS; SUBCUTANEOUS at 09:35

## 2025-06-22 RX ADMIN — IPRATROPIUM BROMIDE AND ALBUTEROL SULFATE 3 ML: .5; 3 SOLUTION RESPIRATORY (INHALATION) at 20:10

## 2025-06-22 RX ADMIN — METHYLPREDNISOLONE SODIUM SUCCINATE 40 MG: 40 INJECTION, POWDER, FOR SOLUTION INTRAMUSCULAR; INTRAVENOUS at 23:05

## 2025-06-22 RX ADMIN — BUDESONIDE 0.5 MG: 0.5 INHALANT RESPIRATORY (INHALATION) at 20:10

## 2025-06-22 RX ADMIN — INSULIN LISPRO 2 UNITS: 100 INJECTION, SOLUTION INTRAVENOUS; SUBCUTANEOUS at 12:17

## 2025-06-22 RX ADMIN — BUDESONIDE 0.5 MG: 0.5 INHALANT RESPIRATORY (INHALATION) at 09:38

## 2025-06-22 RX ADMIN — IPRATROPIUM BROMIDE AND ALBUTEROL SULFATE 3 ML: .5; 3 SOLUTION RESPIRATORY (INHALATION) at 15:26

## 2025-06-22 RX ADMIN — METFORMIN HYDROCHLORIDE 500 MG: 500 TABLET, FILM COATED ORAL at 09:36

## 2025-06-22 RX ADMIN — INSULIN LISPRO 8 UNITS: 100 INJECTION, SOLUTION INTRAVENOUS; SUBCUTANEOUS at 21:09

## 2025-06-22 RX ADMIN — ENOXAPARIN SODIUM 40 MG: 40 INJECTION SUBCUTANEOUS at 09:36

## 2025-06-22 RX ADMIN — LOSARTAN POTASSIUM 25 MG: 25 TABLET, FILM COATED ORAL at 10:15

## 2025-06-22 RX ADMIN — IPRATROPIUM BROMIDE AND ALBUTEROL SULFATE 3 ML: .5; 3 SOLUTION RESPIRATORY (INHALATION) at 09:38

## 2025-06-22 RX ADMIN — Medication 2 L/MIN: at 20:10

## 2025-06-22 RX ADMIN — Medication 50 MCG: at 09:36

## 2025-06-22 RX ADMIN — METFORMIN HYDROCHLORIDE 500 MG: 500 TABLET, FILM COATED ORAL at 17:49

## 2025-06-22 RX ADMIN — INSULIN LISPRO 8 UNITS: 100 INJECTION, SOLUTION INTRAVENOUS; SUBCUTANEOUS at 17:49

## 2025-06-22 RX ADMIN — AZITHROMYCIN DIHYDRATE 500 MG: 250 TABLET ORAL at 17:49

## 2025-06-22 RX ADMIN — METHYLPREDNISOLONE SODIUM SUCCINATE 40 MG: 40 INJECTION, POWDER, FOR SOLUTION INTRAMUSCULAR; INTRAVENOUS at 09:36

## 2025-06-22 ASSESSMENT — ENCOUNTER SYMPTOMS
CARDIOVASCULAR NEGATIVE: 1
ENDOCRINE NEGATIVE: 1
COUGH: 1
FATIGUE: 1
ALLERGIC/IMMUNOLOGIC NEGATIVE: 1
NEUROLOGICAL NEGATIVE: 1
SHORTNESS OF BREATH: 1
GASTROINTESTINAL NEGATIVE: 1
MUSCULOSKELETAL NEGATIVE: 1
HEMATOLOGIC/LYMPHATIC NEGATIVE: 1
EYES NEGATIVE: 1
PSYCHIATRIC NEGATIVE: 1

## 2025-06-22 ASSESSMENT — PAIN - FUNCTIONAL ASSESSMENT
PAIN_FUNCTIONAL_ASSESSMENT: 0-10
PAIN_FUNCTIONAL_ASSESSMENT: 0-10

## 2025-06-22 ASSESSMENT — PAIN SCALES - GENERAL
PAINLEVEL_OUTOF10: 0 - NO PAIN
PAINLEVEL_OUTOF10: 0 - NO PAIN

## 2025-06-22 NOTE — H&P
History Of Present Illness  Kamilah Manzo is a 65 y.o. female presenting with cough and congestion. Pt states she has been sick for about 6 weeks now. 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 Pt states that she has been on two rounds of antibiotics prescribed by her PCP. She feels a little better then get sick again. She reports extreme fatigue, a cough, and congestion. Workup in the ED revealed pneumonia. Pt was admitted to med surg for further care.     ED VS: T36.8, HR 99, RR 20, /105, Sp02 94%RA    Imaging: CXR- 1. Mild left basilar patchy airspace disease may represent pneumonia  or atelectasis. Small left pleural effusion or pleural thickening    Labs: Glu 135, Na 139, K 4.1, Bun/creat 8/0.61, BNP 12, Trop 5, WBC 7.9, H/H 15.4/47.7, Plt 302      Past Medical History  Medical History[1]    Surgical History  Surgical History[2]     Social History  She reports that she has quit smoking. Her smoking use included cigarettes. She has been exposed to tobacco smoke. She has never used smokeless tobacco. She reports that she does not drink alcohol and does not use drugs.    Family History  Family History[3]     Allergies  Propoxyphene-acetaminophen, Tetanus vaccines and toxoid, Doxycycline, and Propoxyphene    Review of Systems   Constitutional:  Positive for fatigue.   HENT:  Positive for congestion and postnasal drip.    Eyes: Negative.    Respiratory:  Positive for cough and shortness of breath.    Cardiovascular: Negative.    Gastrointestinal: Negative.    Endocrine: Negative.    Genitourinary: Negative.    Musculoskeletal: Negative.    Skin: Negative.    Allergic/Immunologic: Negative.    Neurological: Negative.    Hematological: Negative.    Psychiatric/Behavioral: Negative.          Physical Exam  Vitals reviewed.   Constitutional:       Appearance: She is obese.   HENT:      Head: Normocephalic and atraumatic.      Right Ear:  "External ear normal.      Left Ear: External ear normal.      Nose: Congestion present.   Eyes:      Conjunctiva/sclera: Conjunctivae normal.   Cardiovascular:      Rate and Rhythm: Normal rate and regular rhythm.      Pulses: Normal pulses.      Heart sounds: Normal heart sounds.   Pulmonary:      Breath sounds: Rhonchi present.   Abdominal:      General: Bowel sounds are normal.      Palpations: Abdomen is soft.   Musculoskeletal:         General: Normal range of motion.      Cervical back: Normal range of motion and neck supple.   Skin:     General: Skin is dry.   Neurological:      General: No focal deficit present.      Mental Status: She is alert and oriented to person, place, and time.   Psychiatric:         Mood and Affect: Mood normal.         Behavior: Behavior normal.       Last Recorded Vitals  Blood pressure 109/90, pulse 87, temperature 36.2 °C (97.2 °F), temperature source Temporal, resp. rate 18, height 1.676 m (5' 6\"), weight 104 kg (230 lb), SpO2 93%.    Relevant Results  Scheduled medications  Scheduled Medications[4]  Continuous medications  Continuous Medications[5]  PRN medications  PRN Medications[6]    Results for orders placed or performed during the hospital encounter of 06/21/25 (from the past 24 hours)   CBC with Differential   Result Value Ref Range    WBC 7.9 4.4 - 11.3 x10*3/uL    nRBC 0.0 0.0 - 0.0 /100 WBCs    RBC 5.01 4.00 - 5.20 x10*6/uL    Hemoglobin 15.4 12.0 - 16.0 g/dL    Hematocrit 47.7 (H) 36.0 - 46.0 %    MCV 95 80 - 100 fL    MCH 30.7 26.0 - 34.0 pg    MCHC 32.3 32.0 - 36.0 g/dL    RDW 13.1 11.5 - 14.5 %    Platelets 302 150 - 450 x10*3/uL    Neutrophils % 63.1 40.0 - 80.0 %    Immature Granulocytes %, Automated 0.4 0.0 - 0.9 %    Lymphocytes % 25.1 13.0 - 44.0 %    Monocytes % 9.6 2.0 - 10.0 %    Eosinophils % 1.3 0.0 - 6.0 %    Basophils % 0.5 0.0 - 2.0 %    Neutrophils Absolute 5.02 1.20 - 7.70 x10*3/uL    Immature Granulocytes Absolute, Automated 0.03 0.00 - 0.70 " x10*3/uL    Lymphocytes Absolute 1.99 1.20 - 4.80 x10*3/uL    Monocytes Absolute 0.76 0.10 - 1.00 x10*3/uL    Eosinophils Absolute 0.10 0.00 - 0.70 x10*3/uL    Basophils Absolute 0.04 0.00 - 0.10 x10*3/uL   Comprehensive Metabolic Panel   Result Value Ref Range    Glucose 135 (H) 74 - 99 mg/dL    Sodium 139 136 - 145 mmol/L    Potassium 4.1 3.5 - 5.3 mmol/L    Chloride 101 98 - 107 mmol/L    Bicarbonate 29 21 - 32 mmol/L    Anion Gap 13 10 - 20 mmol/L    Urea Nitrogen 8 6 - 23 mg/dL    Creatinine 0.61 0.50 - 1.05 mg/dL    eGFR >90 >60 mL/min/1.73m*2    Calcium 9.4 8.6 - 10.3 mg/dL    Albumin 3.9 3.4 - 5.0 g/dL    Alkaline Phosphatase 85 33 - 136 U/L    Total Protein 6.6 6.4 - 8.2 g/dL    AST 25 9 - 39 U/L    Bilirubin, Total 0.6 0.0 - 1.2 mg/dL    ALT 39 7 - 45 U/L   Brain Natriuretic Peptide   Result Value Ref Range    BNP 12 0 - 99 pg/mL   Troponin I, High Sensitivity, Initial   Result Value Ref Range    Troponin I, High Sensitivity 5 0 - 13 ng/L   Troponin, High Sensitivity, 1 Hour   Result Value Ref Range    Troponin I, High Sensitivity 5 0 - 13 ng/L   POCT GLUCOSE   Result Value Ref Range    POCT Glucose 167 (H) 74 - 99 mg/dL   POCT GLUCOSE   Result Value Ref Range    POCT Glucose 232 (H) 74 - 99 mg/dL   Lavender Top   Result Value Ref Range    Extra Tube Hold for add-ons.    PST Top   Result Value Ref Range    Extra Tube Hold for add-ons.    CBC   Result Value Ref Range    WBC 7.2 4.4 - 11.3 x10*3/uL    nRBC 0.0 0.0 - 0.0 /100 WBCs    RBC 4.65 4.00 - 5.20 x10*6/uL    Hemoglobin 14.4 12.0 - 16.0 g/dL    Hematocrit 44.8 36.0 - 46.0 %    MCV 96 80 - 100 fL    MCH 31.0 26.0 - 34.0 pg    MCHC 32.1 32.0 - 36.0 g/dL    RDW 13.2 11.5 - 14.5 %    Platelets 285 150 - 450 x10*3/uL     Assessment & Plan  Acute pneumonia    COPD exacerbation (Multi)    Acute hypoxic respiratory failure    Failure of outpatient treatment    Type 2 diabetes mellitus with hyperglycemia, without long-term current use of insulin    Vitamin D  deficiency    Hypertension    Hypercholesterolemia      #Pneumonia  #COPD exacerbation  #Acute hypoxic respiratory failure   #Failure of outpatient treatment   -SOB, cough, congestion x6 weeks  -Completed antibiotics as outpatient without improvement  -CXR: 1. Mild left basilar patchy airspace disease may represent pneumonia  or atelectasis. Small left pleural effusion or pleural thickening  -WBC 7.9  -Blood cx pending  -Procal pending  -Sputum cx if able   -PO azithro, IV ceftriaxone (Day 2)  -Solumedrol q8  -Bronchodilators PRN  -RT to eval/treat  -Supplemental oxygen to maintain an sp02 greater than 92%  -Currently on 2L  -Baseline RA  -Continue pulmicort    #DM Type II  -ADA diet   -Continue metformin   -SSI with hypoglycemia protocol  -Monitor BG    #Essential HTN  #HLD  -Continue atorvastatin, losartan  -Monitor BP and HR     #Vit D Deficiency   -Continue cholecalciferol     DVT ppx  -lovenox    PUD ppx  -pantoprazole    F: PRN  E: Replete per protocol  N: ADA  A: PIV    Disposition: Pt requires more than 2 inpatient days at this time   Code Status: Full Code       Jenna Foster, SUAD-CNP         [1]   Past Medical History:  Diagnosis Date    Benign neoplasm of colon, unspecified 02/28/2014    Tubular adenoma of colon    Diverticulosis of large intestine without perforation or abscess without bleeding 12/18/2013    Diverticulosis of colon    Encounter for screening for malignant neoplasm of colon 10/02/2013    Encounter for screening for malignant neoplasm of colon    Gastro-esophageal reflux disease without esophagitis 12/02/2014    GERD (gastroesophageal reflux disease)    Migraine, unspecified, not intractable, without status migrainosus 01/14/2019    Migraine headache    Other conditions influencing health status 04/19/2022    Diabetes mellitus type II, uncontrolled    Pure hypercholesterolemia, unspecified 12/12/2022    Hypercholesterolemia    Seizure disorder (Multi)     had seizures in her 20's    Type  2 diabetes mellitus     Unspecified asthma, uncomplicated ([facility]) 06/08/2022    AB (asthmatic bronchitis)    Unspecified convulsions (Multi) 02/28/2014    Seizures    Vitamin D deficiency, unspecified 12/12/2022    Vitamin D deficiency   [2]   Past Surgical History:  Procedure Laterality Date    BREAST BIOPSY Right     COLONOSCOPY  08/14/2015    Complete Colonoscopy    DILATION AND CURETTAGE OF UTERUS  10/02/2013    Dilation And Curettage Of Cervical Stump    TONSILLECTOMY  02/28/2014    Tonsillectomy    TOTAL ABDOMINAL HYSTERECTOMY W/ BILATERAL SALPINGOOPHORECTOMY  02/28/2014    Total Abdominal Hysterectomy With Removal Of Both Ovaries   [3] No family history on file.  [4] atorvastatin, 40 mg, oral, Daily  azithromycin, 500 mg, oral, q24h  budesonide, 0.5 mg, nebulization, BID  cefTRIAXone, 1 g, intravenous, q24h  cholecalciferol, 50 mcg, oral, Daily  enoxaparin, 40 mg, subcutaneous, Daily  insulin lispro, 0-10 Units, subcutaneous, Before meals & nightly  ipratropium-albuteroL, 3 mL, nebulization, TID  losartan, 25 mg, oral, Daily  metFORMIN, 500 mg, oral, BID  methylPREDNISolone sodium succinate (PF), 40 mg, intravenous, q8h  [5] sodium chloride 0.9%, 10 mL/hr  [6] PRN medications: acetaminophen, alum-mag hydroxide-simeth, benzocaine-menthol, dextrose, dextrose, glucagon, glucagon, guaiFENesin, melatonin, ondansetron, oxygen, polyethylene glycol, sodium chloride, sodium chloride 0.9%

## 2025-06-22 NOTE — CARE PLAN
The patient's goals for the shift include      The clinical goals for the shift include will not become SOB on 2 lpm o2    Goal met. Vss. No c/o pain. Patient remains on 2 lpm O2. Patient had several visitors today. Patient sat in recliner all of today.

## 2025-06-22 NOTE — CARE PLAN
The patient's goals for the shift include      The clinical goals for the shift include breathe    Over the shift, the patient did make progress toward the following goals.     Problem: Pain - Adult  Goal: Verbalizes/displays adequate comfort level or baseline comfort level  Outcome: Progressing     Problem: Safety - Adult  Goal: Free from fall injury  Outcome: Progressing     Problem: Fall/Injury  Goal: Not fall by end of shift  Outcome: Progressing

## 2025-06-23 ENCOUNTER — PHARMACY VISIT (OUTPATIENT)
Dept: PHARMACY | Facility: CLINIC | Age: 65
End: 2025-06-23
Payer: COMMERCIAL

## 2025-06-23 VITALS
RESPIRATION RATE: 146 BRPM | OXYGEN SATURATION: 90 % | BODY MASS INDEX: 36.96 KG/M2 | WEIGHT: 230 LBS | SYSTOLIC BLOOD PRESSURE: 159 MMHG | TEMPERATURE: 97.2 F | HEART RATE: 108 BPM | DIASTOLIC BLOOD PRESSURE: 89 MMHG | HEIGHT: 66 IN

## 2025-06-23 LAB
ANION GAP SERPL CALC-SCNC: 16 MMOL/L (ref 10–20)
ATRIAL RATE: 97 BPM
BUN SERPL-MCNC: 13 MG/DL (ref 6–23)
CALCIUM SERPL-MCNC: 9.5 MG/DL (ref 8.6–10.3)
CHLORIDE SERPL-SCNC: 101 MMOL/L (ref 98–107)
CO2 SERPL-SCNC: 25 MMOL/L (ref 21–32)
CREAT SERPL-MCNC: 0.59 MG/DL (ref 0.5–1.05)
EGFRCR SERPLBLD CKD-EPI 2021: >90 ML/MIN/1.73M*2
ERYTHROCYTE [DISTWIDTH] IN BLOOD BY AUTOMATED COUNT: 12.8 % (ref 11.5–14.5)
GLUCOSE BLD MANUAL STRIP-MCNC: 288 MG/DL (ref 74–99)
GLUCOSE BLD MANUAL STRIP-MCNC: 319 MG/DL (ref 74–99)
GLUCOSE SERPL-MCNC: 295 MG/DL (ref 74–99)
HCT VFR BLD AUTO: 45.2 % (ref 36–46)
HGB BLD-MCNC: 14.7 G/DL (ref 12–16)
HOLD SPECIMEN: NORMAL
HOLD SPECIMEN: NORMAL
MCH RBC QN AUTO: 31.1 PG (ref 26–34)
MCHC RBC AUTO-ENTMCNC: 32.5 G/DL (ref 32–36)
MCV RBC AUTO: 96 FL (ref 80–100)
NRBC BLD-RTO: 0 /100 WBCS (ref 0–0)
P OFFSET: 195 MS
P ONSET: 149 MS
PLATELET # BLD AUTO: 333 X10*3/UL (ref 150–450)
POTASSIUM SERPL-SCNC: 4.6 MMOL/L (ref 3.5–5.3)
PR INTERVAL: 112 MS
PROCALCITONIN SERPL-MCNC: 0.05 NG/ML
Q ONSET: 205 MS
QRS COUNT: 16 BEATS
QRS DURATION: 94 MS
QT INTERVAL: 272 MS
QTC CALCULATION(BAZETT): 345 MS
QTC FREDERICIA: 319 MS
R AXIS: -59 DEGREES
RBC # BLD AUTO: 4.72 X10*6/UL (ref 4–5.2)
SODIUM SERPL-SCNC: 137 MMOL/L (ref 136–145)
T AXIS: 131 DEGREES
T OFFSET: 341 MS
VENTRICULAR RATE: 97 BPM
WBC # BLD AUTO: 9.7 X10*3/UL (ref 4.4–11.3)

## 2025-06-23 PROCEDURE — 82947 ASSAY GLUCOSE BLOOD QUANT: CPT | Mod: IPSPLIT

## 2025-06-23 PROCEDURE — 2500000002 HC RX 250 W HCPCS SELF ADMINISTERED DRUGS (ALT 637 FOR MEDICARE OP, ALT 636 FOR OP/ED): Mod: IPSPLIT | Performed by: HOSPITALIST

## 2025-06-23 PROCEDURE — 2500000004 HC RX 250 GENERAL PHARMACY W/ HCPCS (ALT 636 FOR OP/ED): Mod: JZ,IPSPLIT

## 2025-06-23 PROCEDURE — RXMED WILLOW AMBULATORY MEDICATION CHARGE

## 2025-06-23 PROCEDURE — 94762 N-INVAS EAR/PLS OXIMTRY CONT: CPT | Mod: IPSPLIT

## 2025-06-23 PROCEDURE — 2500000005 HC RX 250 GENERAL PHARMACY W/O HCPCS: Mod: IPSPLIT | Performed by: HOSPITALIST

## 2025-06-23 PROCEDURE — 85027 COMPLETE CBC AUTOMATED: CPT | Mod: IPSPLIT

## 2025-06-23 PROCEDURE — 94640 AIRWAY INHALATION TREATMENT: CPT | Mod: IPSPLIT

## 2025-06-23 PROCEDURE — 80048 BASIC METABOLIC PNL TOTAL CA: CPT | Mod: IPSPLIT

## 2025-06-23 PROCEDURE — 36415 COLL VENOUS BLD VENIPUNCTURE: CPT | Mod: IPSPLIT

## 2025-06-23 PROCEDURE — 9420000001 HC RT PATIENT EDUCATION 5 MIN: Mod: IPSPLIT

## 2025-06-23 PROCEDURE — 94761 N-INVAS EAR/PLS OXIMETRY MLT: CPT | Mod: IPSPLIT

## 2025-06-23 PROCEDURE — 99239 HOSP IP/OBS DSCHRG MGMT >30: CPT | Performed by: NURSE PRACTITIONER

## 2025-06-23 PROCEDURE — 94760 N-INVAS EAR/PLS OXIMETRY 1: CPT | Mod: IPSPLIT

## 2025-06-23 PROCEDURE — 2500000001 HC RX 250 WO HCPCS SELF ADMINISTERED DRUGS (ALT 637 FOR MEDICARE OP): Mod: IPSPLIT | Performed by: HOSPITALIST

## 2025-06-23 RX ORDER — IPRATROPIUM BROMIDE AND ALBUTEROL SULFATE 2.5; .5 MG/3ML; MG/3ML
3 SOLUTION RESPIRATORY (INHALATION)
Qty: 90 ML | Refills: 0 | Status: SHIPPED | OUTPATIENT
Start: 2025-06-23 | End: 2025-07-03

## 2025-06-23 RX ORDER — CEFUROXIME AXETIL 500 MG/1
500 TABLET ORAL 2 TIMES DAILY
Qty: 10 TABLET | Refills: 0 | Status: SHIPPED | OUTPATIENT
Start: 2025-06-23 | End: 2025-06-28

## 2025-06-23 RX ORDER — PREDNISONE 10 MG/1
TABLET ORAL
Qty: 12 TABLET | Refills: 0 | Status: SHIPPED | OUTPATIENT
Start: 2025-06-23 | End: 2025-06-29

## 2025-06-23 RX ADMIN — Medication 2 L/MIN: at 11:13

## 2025-06-23 RX ADMIN — Medication 2 L/MIN: at 06:20

## 2025-06-23 RX ADMIN — ATORVASTATIN CALCIUM 40 MG: 40 TABLET, FILM COATED ORAL at 08:03

## 2025-06-23 RX ADMIN — INSULIN LISPRO 8 UNITS: 100 INJECTION, SOLUTION INTRAVENOUS; SUBCUTANEOUS at 08:02

## 2025-06-23 RX ADMIN — METHYLPREDNISOLONE SODIUM SUCCINATE 40 MG: 40 INJECTION, POWDER, FOR SOLUTION INTRAMUSCULAR; INTRAVENOUS at 06:29

## 2025-06-23 RX ADMIN — IPRATROPIUM BROMIDE AND ALBUTEROL SULFATE 3 ML: .5; 3 SOLUTION RESPIRATORY (INHALATION) at 11:08

## 2025-06-23 RX ADMIN — INSULIN LISPRO 6 UNITS: 100 INJECTION, SOLUTION INTRAVENOUS; SUBCUTANEOUS at 11:53

## 2025-06-23 RX ADMIN — LOSARTAN POTASSIUM 25 MG: 25 TABLET, FILM COATED ORAL at 08:03

## 2025-06-23 RX ADMIN — METFORMIN HYDROCHLORIDE 500 MG: 500 TABLET, FILM COATED ORAL at 08:03

## 2025-06-23 RX ADMIN — BUDESONIDE 0.5 MG: 0.5 INHALANT RESPIRATORY (INHALATION) at 11:08

## 2025-06-23 RX ADMIN — Medication 50 MCG: at 08:03

## 2025-06-23 ASSESSMENT — PAIN SCALES - GENERAL: PAINLEVEL_OUTOF10: 0 - NO PAIN

## 2025-06-23 NOTE — PROGRESS NOTES
06/23/25 1049   Discharge Planning   Living Arrangements Alone   Support Systems Family members;Children   Assistance Needed Patient lives in a 2 story house with her 4 dogs-her grandson is watching them while she is hospitalized.  She maintaines on the 1st floor.  Patient says she is independent with ADLS, IADLS, ambulation and drives.  She works part time -3 hours daily. DME:  Nebulizer, grab bars near tub bath   Type of Residence Private residence   Number of Stairs to Enter Residence 3   Number of Stairs Within Residence 12  (Upstairs-railings)   Do you have animals or pets at home? Yes   Type of Animals or Pets 4 dogs   Home or Post Acute Services None   Expected Discharge Disposition Home   Does the patient need discharge transport arranged? No   Financial Resource Strain   How hard is it for you to pay for the very basics like food, housing, medical care, and heating? Not hard   Housing Stability   In the last 12 months, was there a time when you were not able to pay the mortgage or rent on time? N   In the past 12 months, how many times have you moved where you were living? 0   At any time in the past 12 months, were you homeless or living in a shelter (including now)? N   Transportation Needs   In the past 12 months, has lack of transportation kept you from medical appointments or from getting medications? no   Stroke Family Assessment   Stroke Family Assessment Needed No   Intensity of Service   Intensity of Service 0-30 min     Confirmed address, pharmacy and PCP is Betty Flores.     RUSSELL PLAN:  Home

## 2025-06-23 NOTE — CARE PLAN
The patient's goals for the shift include      The clinical goals for the shift include patient will tolerate overnight trend this shift    Over the shift, the patient did make progress toward the following goals.     Problem: Safety - Adult  Goal: Free from fall injury  Outcome: Progressing     Problem: Pain - Adult  Goal: Verbalizes/displays adequate comfort level or baseline comfort level  Outcome: Progressing

## 2025-06-23 NOTE — PROCEDURES
RT to bedside to complete home O2 certification as ordered by provider.    1100 Pt placed on RA. SpO2 90-92%.  1110 Ambulation began. SpO2 90%.  1112 SpO2 88%. Pt placed on 2L NC.   1113 SpO2 90% on 2L with exertion. Pt has no WOB or c/o SOB. HR 83.    Pt qualifies for 2L NC with exertion. Provider notified.    [facility] for home O2 set up.

## 2025-06-23 NOTE — CARE PLAN
The clinical goals for the shift include Patient will maintain SPO2 at or above 92% this shift.    Over the shift, the patient did maintain SPO2 above 92% while on 2L of O2. All vitals were stable, no reports of pain. Patient has been up to the edge of the bed and the recliner, and ambulated in the halls with therapy. Discharge instructions were reviewed with patient who confirmed understanding. IV removed and last set of vital signs obtained. Patient discharged home at 1404.

## 2025-06-23 NOTE — DISCHARGE INSTR - OTHER ORDERS
Thank you for choosing [facility] for your Health Care needs.  Also, thank you for allowing us to take you and your families preferences into account when determining your discharge plan.  Stay well!     You may receive a survey in the mail within the next couple weeks. Please take the time to complete it and return it. Your input is ALWAYS important to us. Thank you!  Your Care Transition Team - Arabella Pool  & Tiara      For questions about your medications listed on your discharge instructions, please call the Nurses Station at [phone].

## 2025-06-23 NOTE — PROCEDURES
Pt completed an overnight pulse ox trend on room air, on the night of 6/22/25.  Report start date: 6/22/25 10:00:00 PM.  Report end date: 6/23/25 6:00:00 AM.  Total time below spo2 88%: 41 minutes.  Longest duration: 6 minutes 45 seconds.  Lowest spo2: 81 at 06/23/25 12:11:51 AM.  Number of Events: 135.

## 2025-06-23 NOTE — CONSULTS
"Patients name: Kamilah Manzo    1. Do you have any home inhalers? \"Yes, Symbicort and alb.\"    · Spacer education: Pt declined spacer    · If using home inhaler do you rinse your mouth? \"Yes, every time.\"     · Any barriers? \"No.\"    2. When were you diagnosed with COPD? \"I've never been told I have COPD. Just asthma.\"    3. Any previous PFTs? Pt stated she couldn't remember but chart review shows PFTs in 2007.    4. Do you have a pulmonary Dr.? \"Yes.\"    · Name: Dr Tilley    5. Do you currently smoke or have you ever? \"Not now but I used to.\"    · How long have you smoked for? \"Oh, about 48 yrs.\"    · PPD? \"1.\"    6. Do you have a Primary Dr.? \"Dr Betty Flores.\"    7. Do you have any home O2? \"I do now.\"    · Settings for CPAP/BiPAP: N/A    · What company? [employer]    · How much O2 at home? 2L NC HS and with exertion      · Educate on acceptable O2 levels and the importance of monitoring O2 at home.    RT to bedside to see patient for COPD consult. The patient was given a COPD booklet with educational materials regarding pulmonary issues.     [facility] support group discussed. Flyer given for next month’s meeting.     Diagnosed with COPD? I educated patient about the disease process and how it affected the lungs making it difficult to breathe. We discussed current medications and if their current medications give them relief. PFTs? Pulmonary Dr? Primary Dr?     Pulmonary rehab- Educated patient on pulmonary rehab program. The patient was given information regarding our pulmonary rehabilitation program. Spoke to the patient about the benefits of this program which can offer coping skills and exercise strengthening sessions providing a better quality of life. Patient is not receptive to joining the program at this time. Pulmonary rehab referral not placed at the request of the patient.  "

## 2025-06-24 ENCOUNTER — TELEPHONE (OUTPATIENT)
Dept: PRIMARY CARE | Facility: CLINIC | Age: 65
End: 2025-06-24
Payer: MEDICARE

## 2025-06-24 NOTE — DISCHARGE SUMMARY
Discharge Diagnosis  Acute pneumonia       Discharge Meds     Medication List      START taking these medications     cefuroxime 500 mg tablet; Commonly known as: Ceftin; Take 1 tablet (500   mg) by mouth 2 times a day for 5 days. Take with food   ipratropium-albuteroL 0.5-2.5 mg/3 mL nebulizer solution; Commonly known   as: Duo-Neb; Take 3 mL( 1vial) by nebulization 3 times a day for 5 days.   oxygen gas therapy; Commonly known as: O2; Inhale 1 each once daily at   bedtime. Nightly and as needed with exertion   predniSONE 10 mg tablet; Commonly known as: Deltasone; Take 3 tablets   (30 mg) by mouth once daily for 2 days, THEN 2 tablets (20 mg) once daily   for 2 days, THEN 1 tablet (10 mg) once daily for 2 days. Take with food.;   Start taking on: June 23, 2025     CONTINUE taking these medications     * albuterol 2.5 mg /3 mL (0.083 %) nebulizer solution; Take 3 mL (2.5   mg) by nebulization every 6 hours if needed for wheezing.   * albuterol 90 mcg/actuation inhaler; Inhale 2 puffs every 4 hours if   needed for wheezing.   atorvastatin 40 mg tablet; Commonly known as: Lipitor; TAKE 1 TABLET BY   MOUTH EVERY DAY   cholecalciferol 50 mcg (2,000 units) tablet; Commonly known as: Vitamin   D-3   co-enzyme Q-10 30 mg capsule   guaiFENesin 1,200 mg tablet extended release 12hr; Commonly known as:   Mucinex   losartan 25 mg tablet; Commonly known as: Cozaar; TAKE 1 TABLET BY MOUTH   EVERY DAY   metFORMIN 500 mg tablet; Commonly known as: Glucophage; TAKE 2 TABLETS    (500 MG) BY MOUTH ONCE DAILY IN THE EVENING. TAKE BEFORE MEALS.   nebulizers misc   Ozempic 0.25 mg or 0.5 mg (2 mg/3 mL) pen injector; Generic drug:   semaglutide   SUMAtriptan 5 mg/actuation nasal spray; Commonly known as: Imitrex   Symbicort 160-4.5 mcg/actuation inhaler; Generic drug:   budesonide-formoterol; TAKE 2 PUFFS BY MOUTH TWICE A DAY   True Metrix Glucose Test Strip; Generic drug: blood sugar diagnostic;   TEST BLOOD SUGAR DAILY  * This list  "has 2 medication(s) that are the same as other medications   prescribed for you. Read the directions carefully, and ask your doctor or   other care provider to review them with you.     STOP taking these medications     azithromycin 250 mg tablet; Commonly known as: Zithromax       Test Results Pending At Discharge  Pending Labs       Order Current Status    Blood Culture Preliminary result    Blood Culture Preliminary result            Hospital Course   HPI: \"Kamilah Manzo is a 65 y.o. female presenting with cough and congestion. Pt states she has been sick for about 6 weeks now. 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 Pt states that she has been on two rounds of antibiotics prescribed by her PCP. She feels a little better then get sick again. She reports extreme fatigue, a cough, and congestion. Workup in the ED revealed pneumonia. Pt was admitted to med [facility] for further care.\"    Kamilah was admitted to Medicine and treated for pneumonia in the setting of COPD exacerbation and failure of outpatient treatment.  She was given oral and IV antibiotics, steroids and bronchodilators and required 2 L of oxygen.  After improvement she was discharged home with oxygen to wear at night and with exertion, and medication changes listed above.  Chronic medical conditions were also addressed and monitored. PT/OT evals were done. Labs were closely monitored.  She was discharged in stable condition with the above medication changes and/or additions. Recommendations were made to follow up with pt's PCP in 1-2 weeks.   See full inpatient plan below.     Problem list:  #Pneumonia  #COPD exacerbation  #Acute hypoxic respiratory failure   #Failure of outpatient treatment   -SOB, cough, congestion x6 weeks  -Completed antibiotics as outpatient without improvement  -CXR: 1. Mild left basilar patchy airspace disease may represent pneumonia or atelectasis. " Small left pleural effusion or pleural thickening  -WBC 7.9  -Blood cx +GPC  -Procal 0.05  -Sputum cx if able   -PO azithro, IV ceftriaxone (Day 3)  -Solumedrol q8  -Bronchodilators PRN  -RT to eval/treat  -Supplemental oxygen to maintain an sp02 greater than 92%  -Currently on 2L  -Baseline RA  -Continue pulmicort  --- DC home with above medication changes, follow-up as needed with PCP  --- Follow-up with pulmonology for oxygen requirement     #DM Type II  #Essential HTN  #HLD  #Vit D Deficiency   --- Continue chronic medical therapies, follow-up as needed with PCP       Pertinent Physical Exam At Time of Discharge  Physical Exam  Vitals reviewed.   Constitutional:       Appearance: She is obese.   HENT:      Head: Normocephalic and atraumatic.      Right Ear: External ear normal.      Left Ear: External ear normal.      Nose: Congestion present.   Eyes:      Conjunctiva/sclera: Conjunctivae normal.   Cardiovascular:      Rate and Rhythm: Normal rate and regular rhythm.      Pulses: Normal pulses.      Heart sounds: Normal heart sounds.   Pulmonary:      Breath sounds: Rhonchi present.   Abdominal:      General: Bowel sounds are normal.      Palpations: Abdomen is soft.   Musculoskeletal:         General: Normal range of motion.      Cervical back: Normal range of motion and neck supple.   Skin:     General: Skin is dry.   Neurological:      General: No focal deficit present.      Mental Status: She is alert and oriented to person, place, and time.   Psychiatric:         Mood and Affect: Mood normal.         Behavior: Behavior normal.     Stable for discharge to home.  Total cumulative time spent in preparation of this discharge including documentation review, coordination of care with the medical team including PT/SW/care coordinators and treating consultants, discussion with patient and pertinent family members and finalization of prescriptions, follow-up appointments, and this discharge summary was approximately  45 minutes.    Outpatient Follow-Up  Future Appointments   Date Time Provider Department Center   6/26/2025  1:30 PM Betty Flores MD [id number] [facility]   7/24/2025 11:15 AM MD FABIANA [id number] [facility]   8/21/2025 10:00 AM Cam Tilley MD [id number] [facility]         Lulu Villeda, APRN-CNP

## 2025-06-24 NOTE — TELEPHONE ENCOUNTER
Transition of Care    Inpatient facility: [facility]  Discharge diagnosis: PNE, Hypoxia  Discharged to: Home  Discharge date: 6/23/25  Initial Call date: 6/24/25  Spoke with patient/caregiver: Patient                                                                     Do you need assistance  visits prior to your PCP visit: No  Home health care ordered: No  Have you been contacted by home care and have a start of care date: No  Are you taking medications as prescribed at discharge: Yes    Referral to [facility] Pharmacist: No  Patient advised to bring all medications to PCP follow-up appointment.  Patient advised to follow discharge instructions until provider follow-up.  TCM visit date: 6/26/25  TCM provider visit with: GEORGINA Flores MD

## 2025-06-25 ENCOUNTER — PATIENT OUTREACH (OUTPATIENT)
Dept: CARE COORDINATION | Facility: CLINIC | Age: 65
End: 2025-06-25
Payer: MEDICARE

## 2025-06-25 LAB
BACTERIA BLD AEROBE CULT: ABNORMAL
BACTERIA BLD CULT: ABNORMAL
GRAM STN SPEC: ABNORMAL
GRAM STN SPEC: ABNORMAL

## 2025-06-25 NOTE — PROGRESS NOTES
Outreach call to patient to support a smooth transition of care from recent admission.  Left voicemail message for patient with my contact information. Noted PCP office completed SHUN call yesterday.

## 2025-06-26 ENCOUNTER — OFFICE VISIT (OUTPATIENT)
Dept: PRIMARY CARE | Facility: CLINIC | Age: 65
End: 2025-06-26
Payer: MEDICARE

## 2025-06-26 VITALS
HEART RATE: 89 BPM | DIASTOLIC BLOOD PRESSURE: 76 MMHG | SYSTOLIC BLOOD PRESSURE: 138 MMHG | WEIGHT: 238.8 LBS | TEMPERATURE: 97.3 F | OXYGEN SATURATION: 95 % | BODY MASS INDEX: 38.54 KG/M2

## 2025-06-26 DIAGNOSIS — E11.69 TYPE 2 DIABETES MELLITUS WITH OTHER SPECIFIED COMPLICATION, WITHOUT LONG-TERM CURRENT USE OF INSULIN: ICD-10-CM

## 2025-06-26 DIAGNOSIS — J45.909 MODERATE ASTHMA WITHOUT COMPLICATION, UNSPECIFIED WHETHER PERSISTENT (HHS-HCC): ICD-10-CM

## 2025-06-26 DIAGNOSIS — I10 HYPERTENSION, UNSPECIFIED TYPE: ICD-10-CM

## 2025-06-26 DIAGNOSIS — E66.812 CLASS 2 SEVERE OBESITY DUE TO EXCESS CALORIES WITH SERIOUS COMORBIDITY AND BODY MASS INDEX (BMI) OF 38.0 TO 38.9 IN ADULT: ICD-10-CM

## 2025-06-26 DIAGNOSIS — J18.9 PNEUMONIA DUE TO INFECTIOUS ORGANISM, UNSPECIFIED LATERALITY, UNSPECIFIED PART OF LUNG: Primary | ICD-10-CM

## 2025-06-26 DIAGNOSIS — E66.01 CLASS 2 SEVERE OBESITY DUE TO EXCESS CALORIES WITH SERIOUS COMORBIDITY AND BODY MASS INDEX (BMI) OF 38.0 TO 38.9 IN ADULT: ICD-10-CM

## 2025-06-26 LAB — BACTERIA BLD CULT: NORMAL

## 2025-06-26 PROCEDURE — 1159F MED LIST DOCD IN RCRD: CPT | Performed by: INTERNAL MEDICINE

## 2025-06-26 PROCEDURE — 99496 TRANSJ CARE MGMT HIGH F2F 7D: CPT | Performed by: INTERNAL MEDICINE

## 2025-06-26 PROCEDURE — 3075F SYST BP GE 130 - 139MM HG: CPT | Performed by: INTERNAL MEDICINE

## 2025-06-26 PROCEDURE — 3052F HG A1C>EQUAL 8.0%<EQUAL 9.0%: CPT | Performed by: INTERNAL MEDICINE

## 2025-06-26 PROCEDURE — 1111F DSCHRG MED/CURRENT MED MERGE: CPT | Performed by: INTERNAL MEDICINE

## 2025-06-26 PROCEDURE — 1160F RVW MEDS BY RX/DR IN RCRD: CPT | Performed by: INTERNAL MEDICINE

## 2025-06-26 PROCEDURE — 4010F ACE/ARB THERAPY RXD/TAKEN: CPT | Performed by: INTERNAL MEDICINE

## 2025-06-26 PROCEDURE — 3078F DIAST BP <80 MM HG: CPT | Performed by: INTERNAL MEDICINE

## 2025-06-26 ASSESSMENT — ENCOUNTER SYMPTOMS: FATIGUE: 1

## 2025-07-01 DIAGNOSIS — E11.65 TYPE 2 DIABETES MELLITUS WITH HYPERGLYCEMIA, WITHOUT LONG-TERM CURRENT USE OF INSULIN: ICD-10-CM

## 2025-07-01 DIAGNOSIS — J45.909 ASTHMATIC BRONCHITIS WITHOUT COMPLICATION, UNSPECIFIED ASTHMA SEVERITY, UNSPECIFIED WHETHER PERSISTENT (HHS-HCC): Primary | ICD-10-CM

## 2025-07-01 DIAGNOSIS — J45.902 ASTHMA WITH STATUS ASTHMATICUS, UNSPECIFIED ASTHMA SEVERITY, UNSPECIFIED WHETHER PERSISTENT (HHS-HCC): ICD-10-CM

## 2025-07-01 DIAGNOSIS — J44.1 COPD EXACERBATION (MULTI): ICD-10-CM

## 2025-07-01 NOTE — PROGRESS NOTES
I reviewed the progress note and agree with the resident’s findings and plans as written. Case discussed with resident.    Ivelisse De La Fuente, PharmD

## 2025-07-02 ENCOUNTER — PATIENT OUTREACH (OUTPATIENT)
Dept: CARE COORDINATION | Facility: CLINIC | Age: 65
End: 2025-07-02
Payer: MEDICARE

## 2025-07-18 ENCOUNTER — HOSPITAL ENCOUNTER (OUTPATIENT)
Dept: RADIOLOGY | Facility: HOSPITAL | Age: 65
Discharge: HOME | End: 2025-07-18
Payer: MEDICARE

## 2025-07-18 DIAGNOSIS — J18.9 PNEUMONIA DUE TO INFECTIOUS ORGANISM, UNSPECIFIED LATERALITY, UNSPECIFIED PART OF LUNG: ICD-10-CM

## 2025-07-18 LAB
ATRIAL RATE: 97 BPM
P OFFSET: 195 MS
P ONSET: 149 MS
PR INTERVAL: 112 MS
Q ONSET: 205 MS
QRS COUNT: 16 BEATS
QRS DURATION: 94 MS
QT INTERVAL: 272 MS
QTC CALCULATION(BAZETT): 345 MS
QTC FREDERICIA: 319 MS
R AXIS: -59 DEGREES
T AXIS: 131 DEGREES
T OFFSET: 341 MS
VENTRICULAR RATE: 97 BPM

## 2025-07-18 PROCEDURE — 71046 X-RAY EXAM CHEST 2 VIEWS: CPT

## 2025-07-24 ENCOUNTER — APPOINTMENT (OUTPATIENT)
Dept: PRIMARY CARE | Facility: CLINIC | Age: 65
End: 2025-07-24
Payer: MEDICARE

## 2025-07-24 VITALS
WEIGHT: 245.6 LBS | BODY MASS INDEX: 39.64 KG/M2 | SYSTOLIC BLOOD PRESSURE: 128 MMHG | TEMPERATURE: 97.6 F | OXYGEN SATURATION: 98 % | DIASTOLIC BLOOD PRESSURE: 78 MMHG | HEART RATE: 87 BPM

## 2025-07-24 DIAGNOSIS — E66.812 CLASS 2 SEVERE OBESITY DUE TO EXCESS CALORIES WITH SERIOUS COMORBIDITY AND BODY MASS INDEX (BMI) OF 39.0 TO 39.9 IN ADULT: ICD-10-CM

## 2025-07-24 DIAGNOSIS — E11.69 TYPE 2 DIABETES MELLITUS WITH OTHER SPECIFIED COMPLICATION, WITHOUT LONG-TERM CURRENT USE OF INSULIN: Primary | ICD-10-CM

## 2025-07-24 DIAGNOSIS — E78.00 HYPERCHOLESTEREMIA: ICD-10-CM

## 2025-07-24 DIAGNOSIS — E66.01 CLASS 2 SEVERE OBESITY DUE TO EXCESS CALORIES WITH SERIOUS COMORBIDITY AND BODY MASS INDEX (BMI) OF 39.0 TO 39.9 IN ADULT: ICD-10-CM

## 2025-07-24 DIAGNOSIS — I10 HYPERTENSION, UNSPECIFIED TYPE: ICD-10-CM

## 2025-07-24 DIAGNOSIS — J18.9 PNEUMONIA DUE TO INFECTIOUS ORGANISM, UNSPECIFIED LATERALITY, UNSPECIFIED PART OF LUNG: ICD-10-CM

## 2025-07-24 DIAGNOSIS — K44.9 PARAESOPHAGEAL HERNIA: ICD-10-CM

## 2025-07-24 DIAGNOSIS — R60.0 LOCALIZED EDEMA: ICD-10-CM

## 2025-07-24 PROCEDURE — 99214 OFFICE O/P EST MOD 30 MIN: CPT | Performed by: INTERNAL MEDICINE

## 2025-07-24 PROCEDURE — 1159F MED LIST DOCD IN RCRD: CPT | Performed by: INTERNAL MEDICINE

## 2025-07-24 PROCEDURE — 4010F ACE/ARB THERAPY RXD/TAKEN: CPT | Performed by: INTERNAL MEDICINE

## 2025-07-24 PROCEDURE — 3078F DIAST BP <80 MM HG: CPT | Performed by: INTERNAL MEDICINE

## 2025-07-24 PROCEDURE — 3074F SYST BP LT 130 MM HG: CPT | Performed by: INTERNAL MEDICINE

## 2025-07-24 PROCEDURE — 1160F RVW MEDS BY RX/DR IN RCRD: CPT | Performed by: INTERNAL MEDICINE

## 2025-07-24 PROCEDURE — G2211 COMPLEX E/M VISIT ADD ON: HCPCS | Performed by: INTERNAL MEDICINE

## 2025-07-24 RX ORDER — SEMAGLUTIDE 0.68 MG/ML
0.25 INJECTION, SOLUTION SUBCUTANEOUS
Qty: 5 ML | Refills: 0 | Status: CANCELLED | OUTPATIENT
Start: 2025-07-27

## 2025-07-24 NOTE — PROGRESS NOTES
Subjective   Patient ID: Kamilah Manzo is a 65 y.o. female who presents for Follow-up (6 weeks ) and Foot Swelling (X couple months ).  HPI    Follow up    pneumonia       On meds feeling better but still tired       No fever, wheezing       Less dyspnea and cough       On oxygen 2 l nc at night not using       [place] to check nocturnal pulse ox for continued need of oxygen       chest xray clear    Intermittent LE edema  Possibly heat related  Check echo  Compression socks  Low salt                h/o chest pain, htn resolved          Under stress /           Cardio follow up with stress test neg          EKG SR 80  5-24             DM -2 uncontrolled          HBA1C 8.1  6-25           this am          on metformin tolerating well          Jardiance caused genital redness / stopped          Increase  Ozempic 1mg  weekly           No side effects                  hypertension better on rx no side effects                   Paraesophageal hernia          Surgical consult / not done yet     asthma on rx no side effects     Smoking cessation quit     hypercholesterolemia on rx no side effects      vit D stable on rx no side effects     H/o seizures in 20's  No cause found and samantha since     diet / exercise rev'd     eyes and feet on follow up     check CT urogram not done (too $$$)  urology consult not done     mammogram 9-24    Review of Systems   All other systems reviewed and are negative.      Objective   /78   Pulse 87   Temp 36.4 °C (97.6 °F)   Wt 111 kg (245 lb 9.6 oz)   LMP  (LMP Unknown)   SpO2 98%   BMI 39.64 kg/m²   Lab Results   Component Value Date    WBC 9.7 06/23/2025    HGB 14.7 06/23/2025    HCT 45.2 06/23/2025     06/23/2025    CHOL 153 11/29/2024    TRIG 213 (H) 11/29/2024    HDL 40.3 11/29/2024    ALT 39 06/21/2025    AST 25 06/21/2025     06/23/2025    K 4.6 06/23/2025     06/23/2025    CREATININE 0.59 06/23/2025    BUN 13 06/23/2025    CO2 25  06/23/2025    TSH 1.39 11/29/2024    INR 1.0 07/21/2023    HGBA1C 8.1 (H) 06/21/2025           Physical Exam  Vitals reviewed.   Constitutional:       Appearance: Normal appearance. She is obese.   HENT:      Head: Normocephalic and atraumatic.      Mouth/Throat:      Pharynx: No posterior oropharyngeal erythema.     Eyes:      General: No scleral icterus.     Conjunctiva/sclera: Conjunctivae normal.      Pupils: Pupils are equal, round, and reactive to light.       Cardiovascular:      Rate and Rhythm: Normal rate and regular rhythm.      Heart sounds: Normal heart sounds.      Comments: B/l ankle edema  Pulmonary:      Effort: No respiratory distress.      Breath sounds: No wheezing.   Abdominal:      General: Abdomen is flat. Bowel sounds are normal. There is no distension.      Palpations: Abdomen is soft. There is no mass.      Tenderness: There is no abdominal tenderness. There is no rebound.     Musculoskeletal:         General: Normal range of motion.      Cervical back: Normal range of motion and neck supple.     Skin:     General: Skin is warm and dry.     Neurological:      General: No focal deficit present.      Mental Status: She is alert and oriented to person, place, and time. Mental status is at baseline.     Psychiatric:         Mood and Affect: Mood normal.         Behavior: Behavior normal.         Thought Content: Thought content normal.         Judgment: Judgment normal.         Problem List Items Addressed This Visit           ICD-10-CM    Hypertension I10    Obese E66.9    Pneumonia due to infectious organism, unspecified laterality, unspecified part of lung J18.9     Other Visit Diagnoses         Codes      Type 2 diabetes mellitus with other specified complication, without long-term current use of insulin    -  Primary E11.69    Relevant Medications    semaglutide (OZEMPIC) 1 mg/dose (4 mg/3 mL) pen injector      Localized edema     R60.0    Relevant Orders    Transthoracic Echo Complete       Hypercholesteremia     E78.00      Paraesophageal hernia     K44.9    Relevant Medications    semaglutide (OZEMPIC) 1 mg/dose (4 mg/3 mL) pen injector          Assessment/Plan     Follow up    pneumonia       On meds feeling better but still tired       No fever, wheezing       Less dyspnea and cough       On oxygen 2 l nc at night not using       [place] to check nocturnal pulse ox for continued need of oxygen       chest xray clear    Intermittent LE edema  Possibly heat related  Check echo  Compression socks  Low salt                h/o chest pain, htn resolved          Under stress /           Cardio follow up with stress test neg          EKG SR 80  5-24             DM -2 uncontrolled          HBA1C 8.1  6-25           this am          on metformin tolerating well          Jardiance caused genital redness / stopped          Increase  Ozempic 1mg  weekly           No side effects                  hypertension better on rx no side effects                   Paraesophageal hernia          Surgical consult / not done yet     asthma on rx no side effects     Smoking cessation quit     hypercholesterolemia on rx no side effects      vit D stable on rx no side effects     H/o seizures in 20's  No cause found and samantha since     diet / exercise rev'd     eyes and feet on follow up     check CT urogram not done (too $$$)  urology consult not done     mammogram 9-24        mammogram 9-24  dexa 9-24 penia  colonoscopy 2-25  recheck 2035  GYN n/a  CT lung cancer screening n/a  immunizations rev'd UTD  BMI 39.6    Check echo, nocturnal pulse oximetry    Follow up 6 weeks

## 2025-08-01 DIAGNOSIS — J45.909 MODERATE ASTHMA WITHOUT COMPLICATION, UNSPECIFIED WHETHER PERSISTENT (HHS-HCC): ICD-10-CM

## 2025-08-02 RX ORDER — ALBUTEROL SULFATE 90 UG/1
2 INHALANT RESPIRATORY (INHALATION) EVERY 4 HOURS PRN
Qty: 18 G | Refills: 0 | Status: SHIPPED | OUTPATIENT
Start: 2025-08-02

## 2025-08-08 ENCOUNTER — HOSPITAL ENCOUNTER (OUTPATIENT)
Dept: CARDIOLOGY | Facility: HOSPITAL | Age: 65
Discharge: HOME | End: 2025-08-08
Payer: MEDICARE

## 2025-08-08 DIAGNOSIS — R60.0 LOCALIZED EDEMA: ICD-10-CM

## 2025-08-08 LAB
AORTIC VALVE PEAK VELOCITY: 1.58 M/S
AV PEAK GRADIENT: 10 MMHG
AVA (PEAK VEL): 2.64 CM2
EJECTION FRACTION APICAL 4 CHAMBER: 71.3
EJECTION FRACTION: 63 %
LEFT ATRIUM VOLUME AREA LENGTH INDEX BSA: 20.4 ML/M2
LEFT VENTRICLE INTERNAL DIMENSION DIASTOLE: 4.6 CM (ref 3.5–6)
LEFT VENTRICULAR OUTFLOW TRACT DIAMETER: 2.04 CM
MITRAL VALVE E/A RATIO: 0.82
MITRAL VALVE E/E' RATIO: 7.64
RIGHT VENTRICLE FREE WALL PEAK S': 20.53 CM/S
TRICUSPID ANNULAR PLANE SYSTOLIC EXCURSION: 2.5 CM

## 2025-08-08 PROCEDURE — 93306 TTE W/DOPPLER COMPLETE: CPT | Performed by: INTERNAL MEDICINE

## 2025-08-08 PROCEDURE — 93306 TTE W/DOPPLER COMPLETE: CPT

## 2025-08-13 DIAGNOSIS — J45.901 MODERATE ASTHMA WITH ACUTE EXACERBATION, UNSPECIFIED WHETHER PERSISTENT (HHS-HCC): ICD-10-CM

## 2025-08-13 RX ORDER — BUDESONIDE AND FORMOTEROL FUMARATE DIHYDRATE 160; 4.5 UG/1; UG/1
2 AEROSOL RESPIRATORY (INHALATION) 2 TIMES DAILY
Qty: 10.2 G | Refills: 0 | Status: SHIPPED | OUTPATIENT
Start: 2025-08-13

## 2025-08-18 DIAGNOSIS — E11.69 TYPE 2 DIABETES MELLITUS WITH OTHER SPECIFIED COMPLICATION, WITHOUT LONG-TERM CURRENT USE OF INSULIN: ICD-10-CM

## 2025-08-19 DIAGNOSIS — E78.00 HYPERCHOLESTEROLEMIA: ICD-10-CM

## 2025-08-19 DIAGNOSIS — E11.69 TYPE 2 DIABETES MELLITUS WITH OTHER SPECIFIED COMPLICATION, WITHOUT LONG-TERM CURRENT USE OF INSULIN: ICD-10-CM

## 2025-08-19 RX ORDER — METFORMIN HYDROCHLORIDE 500 MG/1
TABLET ORAL
Qty: 180 TABLET | Refills: 0 | Status: SHIPPED | OUTPATIENT
Start: 2025-08-19

## 2025-08-19 RX ORDER — ATORVASTATIN CALCIUM 40 MG/1
40 TABLET, FILM COATED ORAL DAILY
Qty: 90 TABLET | Refills: 0 | Status: SHIPPED | OUTPATIENT
Start: 2025-08-19

## 2025-08-19 RX ORDER — SEMAGLUTIDE 1.34 MG/ML
1 INJECTION, SOLUTION SUBCUTANEOUS
Qty: 3 ML | Refills: 0 | Status: SHIPPED | OUTPATIENT
Start: 2025-08-19

## 2025-08-21 ENCOUNTER — APPOINTMENT (OUTPATIENT)
Dept: PULMONOLOGY | Facility: CLINIC | Age: 65
End: 2025-08-21
Payer: MEDICARE

## 2025-08-23 DIAGNOSIS — I10 HYPERTENSION, UNSPECIFIED TYPE: ICD-10-CM

## 2025-08-25 RX ORDER — LOSARTAN POTASSIUM 25 MG/1
25 TABLET ORAL DAILY
Qty: 30 TABLET | Refills: 0 | Status: SHIPPED | OUTPATIENT
Start: 2025-08-25

## 2025-08-28 ENCOUNTER — APPOINTMENT (OUTPATIENT)
Dept: PRIMARY CARE | Facility: CLINIC | Age: 65
End: 2025-08-28
Payer: MEDICARE

## 2025-08-28 VITALS
BODY MASS INDEX: 38.64 KG/M2 | WEIGHT: 239.4 LBS | HEART RATE: 69 BPM | DIASTOLIC BLOOD PRESSURE: 72 MMHG | OXYGEN SATURATION: 93 % | SYSTOLIC BLOOD PRESSURE: 130 MMHG | TEMPERATURE: 96.9 F

## 2025-08-28 DIAGNOSIS — Z12.31 ENCOUNTER FOR SCREENING MAMMOGRAM FOR MALIGNANT NEOPLASM OF BREAST: ICD-10-CM

## 2025-08-28 DIAGNOSIS — E66.01 CLASS 2 SEVERE OBESITY DUE TO EXCESS CALORIES WITH SERIOUS COMORBIDITY AND BODY MASS INDEX (BMI) OF 38.0 TO 38.9 IN ADULT: ICD-10-CM

## 2025-08-28 DIAGNOSIS — E78.00 HYPERCHOLESTEREMIA: ICD-10-CM

## 2025-08-28 DIAGNOSIS — K44.9 PARAESOPHAGEAL HERNIA: ICD-10-CM

## 2025-08-28 DIAGNOSIS — I10 HYPERTENSION, UNSPECIFIED TYPE: ICD-10-CM

## 2025-08-28 DIAGNOSIS — E55.9 VITAMIN D DEFICIENCY: ICD-10-CM

## 2025-08-28 DIAGNOSIS — E11.69 TYPE 2 DIABETES MELLITUS WITH OTHER SPECIFIED COMPLICATION, WITHOUT LONG-TERM CURRENT USE OF INSULIN: Primary | ICD-10-CM

## 2025-08-28 DIAGNOSIS — G43.909 MIGRAINE WITHOUT STATUS MIGRAINOSUS, NOT INTRACTABLE, UNSPECIFIED MIGRAINE TYPE: ICD-10-CM

## 2025-08-28 DIAGNOSIS — E66.812 CLASS 2 SEVERE OBESITY DUE TO EXCESS CALORIES WITH SERIOUS COMORBIDITY AND BODY MASS INDEX (BMI) OF 38.0 TO 38.9 IN ADULT: ICD-10-CM

## 2025-08-28 PROCEDURE — 4010F ACE/ARB THERAPY RXD/TAKEN: CPT | Performed by: INTERNAL MEDICINE

## 2025-08-28 PROCEDURE — 1159F MED LIST DOCD IN RCRD: CPT | Performed by: INTERNAL MEDICINE

## 2025-08-28 PROCEDURE — 1160F RVW MEDS BY RX/DR IN RCRD: CPT | Performed by: INTERNAL MEDICINE

## 2025-08-28 PROCEDURE — 3078F DIAST BP <80 MM HG: CPT | Performed by: INTERNAL MEDICINE

## 2025-08-28 PROCEDURE — G2211 COMPLEX E/M VISIT ADD ON: HCPCS | Performed by: INTERNAL MEDICINE

## 2025-08-28 PROCEDURE — 3075F SYST BP GE 130 - 139MM HG: CPT | Performed by: INTERNAL MEDICINE

## 2025-08-28 PROCEDURE — 99214 OFFICE O/P EST MOD 30 MIN: CPT | Performed by: INTERNAL MEDICINE

## 2025-08-28 PROCEDURE — 3052F HG A1C>EQUAL 8.0%<EQUAL 9.0%: CPT | Performed by: INTERNAL MEDICINE

## 2025-08-28 RX ORDER — BLOOD-GLUCOSE,RECEIVER,CONT
1 EACH MISCELLANEOUS AS NEEDED
COMMUNITY

## 2025-08-28 RX ORDER — BLOOD-GLUCOSE SENSOR
EACH MISCELLANEOUS
COMMUNITY
End: 2025-08-28 | Stop reason: SDUPTHER

## 2025-08-28 RX ORDER — BLOOD-GLUCOSE,RECEIVER,CONT
EACH MISCELLANEOUS
Qty: 1 EACH | Refills: 0 | Status: CANCELLED | OUTPATIENT
Start: 2025-08-28

## 2025-08-28 RX ORDER — CALCIUM CITRATE/VITAMIN D3 200MG-6.25
TABLET ORAL
Qty: 100 STRIP | Refills: 3 | Status: SHIPPED | OUTPATIENT
Start: 2025-08-28 | End: 2025-08-28 | Stop reason: ENTERED-IN-ERROR

## 2025-08-28 RX ORDER — SEMAGLUTIDE 1.34 MG/ML
2 INJECTION, SOLUTION SUBCUTANEOUS
Qty: 6 ML | Refills: 0 | Status: SHIPPED | OUTPATIENT
Start: 2025-08-28 | End: 2025-08-28 | Stop reason: ENTERED-IN-ERROR

## 2025-08-28 RX ORDER — BLOOD-GLUCOSE SENSOR
EACH MISCELLANEOUS
Qty: 6 EACH | Refills: 3 | Status: SHIPPED | OUTPATIENT
Start: 2025-08-28

## 2025-08-28 RX ORDER — BLOOD-GLUCOSE SENSOR
EACH MISCELLANEOUS
Qty: 6 EACH | Refills: 0 | Status: CANCELLED | OUTPATIENT
Start: 2025-08-28

## 2025-08-28 RX ORDER — SUMATRIPTAN 5 MG/1
1 SPRAY NASAL DAILY PRN
Qty: 3 EACH | Refills: 0 | Status: SHIPPED | OUTPATIENT
Start: 2025-08-28

## 2025-09-05 ENCOUNTER — OFFICE VISIT (OUTPATIENT)
Dept: GASTROENTEROLOGY | Facility: CLINIC | Age: 65
End: 2025-09-05
Payer: MEDICARE

## 2025-09-05 VITALS
RESPIRATION RATE: 20 BRPM | BODY MASS INDEX: 38.76 KG/M2 | HEART RATE: 81 BPM | DIASTOLIC BLOOD PRESSURE: 81 MMHG | WEIGHT: 241.2 LBS | OXYGEN SATURATION: 94 % | SYSTOLIC BLOOD PRESSURE: 145 MMHG | HEIGHT: 66 IN

## 2025-09-05 DIAGNOSIS — R13.19 ESOPHAGEAL DYSPHAGIA: ICD-10-CM

## 2025-09-05 DIAGNOSIS — K44.9 PARAESOPHAGEAL HERNIA: Primary | ICD-10-CM

## 2025-09-05 PROBLEM — R10.11 ABDOMINAL PAIN, RUQ: Status: RESOLVED | Noted: 2023-06-09 | Resolved: 2025-09-05

## 2025-09-05 PROBLEM — J02.9 ACUTE PHARYNGITIS: Status: RESOLVED | Noted: 2023-06-09 | Resolved: 2025-09-05

## 2025-09-05 PROBLEM — H10.30 ACUTE CONJUNCTIVITIS: Status: RESOLVED | Noted: 2023-06-09 | Resolved: 2025-09-05

## 2025-09-05 PROBLEM — F41.9 ANXIETY: Status: RESOLVED | Noted: 2023-06-09 | Resolved: 2025-09-05

## 2025-09-05 PROBLEM — J01.90 ACUTE SINUSITIS: Status: RESOLVED | Noted: 2023-06-09 | Resolved: 2025-09-05

## 2025-09-05 PROBLEM — K52.9 ACUTE GASTROENTERITIS: Status: RESOLVED | Noted: 2023-06-09 | Resolved: 2025-09-05

## 2025-09-05 PROCEDURE — 99204 OFFICE O/P NEW MOD 45 MIN: CPT

## 2025-09-05 PROCEDURE — 3052F HG A1C>EQUAL 8.0%<EQUAL 9.0%: CPT

## 2025-09-05 PROCEDURE — 1160F RVW MEDS BY RX/DR IN RCRD: CPT

## 2025-09-05 PROCEDURE — 3079F DIAST BP 80-89 MM HG: CPT

## 2025-09-05 PROCEDURE — 3077F SYST BP >= 140 MM HG: CPT

## 2025-09-05 PROCEDURE — 1126F AMNT PAIN NOTED NONE PRSNT: CPT

## 2025-09-05 PROCEDURE — 4010F ACE/ARB THERAPY RXD/TAKEN: CPT

## 2025-09-05 PROCEDURE — 1159F MED LIST DOCD IN RCRD: CPT

## 2025-09-05 PROCEDURE — 3008F BODY MASS INDEX DOCD: CPT

## 2025-09-05 ASSESSMENT — PAIN SCALES - GENERAL: PAINLEVEL_OUTOF10: 0-NO PAIN

## 2025-09-05 ASSESSMENT — PATIENT HEALTH QUESTIONNAIRE - PHQ9
2. FEELING DOWN, DEPRESSED OR HOPELESS: NOT AT ALL
1. LITTLE INTEREST OR PLEASURE IN DOING THINGS: NOT AT ALL
SUM OF ALL RESPONSES TO PHQ9 QUESTIONS 1 AND 2: 0

## 2025-09-05 ASSESSMENT — ENCOUNTER SYMPTOMS: ROS GI COMMENTS: SEE HPI

## 2025-10-30 ENCOUNTER — APPOINTMENT (OUTPATIENT)
Dept: PRIMARY CARE | Facility: CLINIC | Age: 65
End: 2025-10-30
Payer: MEDICARE

## 2025-11-21 ENCOUNTER — APPOINTMENT (OUTPATIENT)
Dept: GASTROENTEROLOGY | Facility: CLINIC | Age: 65
End: 2025-11-21
Payer: MEDICARE